# Patient Record
Sex: MALE | Race: WHITE | NOT HISPANIC OR LATINO | Employment: FULL TIME | URBAN - METROPOLITAN AREA
[De-identification: names, ages, dates, MRNs, and addresses within clinical notes are randomized per-mention and may not be internally consistent; named-entity substitution may affect disease eponyms.]

---

## 2017-01-18 ENCOUNTER — COMMUNICATION - HEALTHEAST (OUTPATIENT)
Dept: FAMILY MEDICINE | Facility: CLINIC | Age: 47
End: 2017-01-18

## 2017-01-18 DIAGNOSIS — F90.0 ATTENTION DEFICIT HYPERACTIVITY DISORDER (ADHD), PREDOMINANTLY INATTENTIVE TYPE: ICD-10-CM

## 2017-02-16 ENCOUNTER — COMMUNICATION - HEALTHEAST (OUTPATIENT)
Dept: FAMILY MEDICINE | Facility: CLINIC | Age: 47
End: 2017-02-16

## 2017-02-16 DIAGNOSIS — F90.0 ATTENTION DEFICIT HYPERACTIVITY DISORDER (ADHD), PREDOMINANTLY INATTENTIVE TYPE: ICD-10-CM

## 2017-03-22 ENCOUNTER — COMMUNICATION - HEALTHEAST (OUTPATIENT)
Dept: FAMILY MEDICINE | Facility: CLINIC | Age: 47
End: 2017-03-22

## 2017-03-22 DIAGNOSIS — F90.0 ATTENTION DEFICIT HYPERACTIVITY DISORDER (ADHD), PREDOMINANTLY INATTENTIVE TYPE: ICD-10-CM

## 2017-04-25 ENCOUNTER — COMMUNICATION - HEALTHEAST (OUTPATIENT)
Dept: SCHEDULING | Facility: CLINIC | Age: 47
End: 2017-04-25

## 2017-04-25 DIAGNOSIS — F90.0 ATTENTION DEFICIT HYPERACTIVITY DISORDER (ADHD), PREDOMINANTLY INATTENTIVE TYPE: ICD-10-CM

## 2017-06-01 ENCOUNTER — COMMUNICATION - HEALTHEAST (OUTPATIENT)
Dept: FAMILY MEDICINE | Facility: CLINIC | Age: 47
End: 2017-06-01

## 2017-06-01 DIAGNOSIS — F90.0 ATTENTION DEFICIT HYPERACTIVITY DISORDER (ADHD), PREDOMINANTLY INATTENTIVE TYPE: ICD-10-CM

## 2017-06-28 ENCOUNTER — COMMUNICATION - HEALTHEAST (OUTPATIENT)
Dept: FAMILY MEDICINE | Facility: CLINIC | Age: 47
End: 2017-06-28

## 2017-06-28 DIAGNOSIS — F90.0 ATTENTION DEFICIT HYPERACTIVITY DISORDER (ADHD), PREDOMINANTLY INATTENTIVE TYPE: ICD-10-CM

## 2017-08-03 ENCOUNTER — COMMUNICATION - HEALTHEAST (OUTPATIENT)
Dept: FAMILY MEDICINE | Facility: CLINIC | Age: 47
End: 2017-08-03

## 2017-08-09 ENCOUNTER — COMMUNICATION - HEALTHEAST (OUTPATIENT)
Dept: FAMILY MEDICINE | Facility: CLINIC | Age: 47
End: 2017-08-09

## 2017-08-14 ENCOUNTER — OFFICE VISIT - HEALTHEAST (OUTPATIENT)
Dept: FAMILY MEDICINE | Facility: CLINIC | Age: 47
End: 2017-08-14

## 2017-08-14 DIAGNOSIS — F90.0 ATTENTION DEFICIT HYPERACTIVITY DISORDER (ADHD), PREDOMINANTLY INATTENTIVE TYPE: ICD-10-CM

## 2017-08-14 ASSESSMENT — MIFFLIN-ST. JEOR: SCORE: 1989.37

## 2017-09-15 ENCOUNTER — COMMUNICATION - HEALTHEAST (OUTPATIENT)
Dept: FAMILY MEDICINE | Facility: CLINIC | Age: 47
End: 2017-09-15

## 2017-09-15 DIAGNOSIS — F90.0 ATTENTION DEFICIT HYPERACTIVITY DISORDER (ADHD), PREDOMINANTLY INATTENTIVE TYPE: ICD-10-CM

## 2017-09-29 ENCOUNTER — OFFICE VISIT - HEALTHEAST (OUTPATIENT)
Dept: FAMILY MEDICINE | Facility: CLINIC | Age: 47
End: 2017-09-29

## 2017-09-29 DIAGNOSIS — F90.9 ADHD (ATTENTION DEFICIT HYPERACTIVITY DISORDER): ICD-10-CM

## 2017-09-29 DIAGNOSIS — Z00.00 PREVENTATIVE HEALTH CARE: ICD-10-CM

## 2017-09-29 LAB
CHOLEST SERPL-MCNC: 241 MG/DL
FASTING STATUS PATIENT QL REPORTED: YES
HDLC SERPL-MCNC: 28 MG/DL
LDLC SERPL CALC-MCNC: 155 MG/DL
TRIGL SERPL-MCNC: 289 MG/DL

## 2017-09-29 ASSESSMENT — MIFFLIN-ST. JEOR: SCORE: 1974.9

## 2017-10-11 ENCOUNTER — COMMUNICATION - HEALTHEAST (OUTPATIENT)
Dept: HEALTH INFORMATION MANAGEMENT | Facility: CLINIC | Age: 47
End: 2017-10-11

## 2017-10-11 ENCOUNTER — COMMUNICATION - HEALTHEAST (OUTPATIENT)
Dept: TELEHEALTH | Facility: CLINIC | Age: 47
End: 2017-10-11

## 2017-10-19 ENCOUNTER — COMMUNICATION - HEALTHEAST (OUTPATIENT)
Dept: FAMILY MEDICINE | Facility: CLINIC | Age: 47
End: 2017-10-19

## 2017-10-19 DIAGNOSIS — F90.0 ATTENTION DEFICIT HYPERACTIVITY DISORDER (ADHD), PREDOMINANTLY INATTENTIVE TYPE: ICD-10-CM

## 2017-11-27 ENCOUNTER — COMMUNICATION - HEALTHEAST (OUTPATIENT)
Dept: FAMILY MEDICINE | Facility: CLINIC | Age: 47
End: 2017-11-27

## 2017-11-27 DIAGNOSIS — F90.0 ATTENTION DEFICIT HYPERACTIVITY DISORDER (ADHD), PREDOMINANTLY INATTENTIVE TYPE: ICD-10-CM

## 2017-11-30 ENCOUNTER — OFFICE VISIT - HEALTHEAST (OUTPATIENT)
Dept: FAMILY MEDICINE | Facility: CLINIC | Age: 47
End: 2017-11-30

## 2017-11-30 DIAGNOSIS — Z00.00 ROUTINE GENERAL MEDICAL EXAMINATION AT A HEALTH CARE FACILITY: ICD-10-CM

## 2017-11-30 DIAGNOSIS — F90.9 ADHD (ATTENTION DEFICIT HYPERACTIVITY DISORDER): ICD-10-CM

## 2017-11-30 DIAGNOSIS — R03.0 ELEVATED BLOOD PRESSURE READING: ICD-10-CM

## 2017-11-30 DIAGNOSIS — F33.9 MAJOR DEPRESSIVE DISORDER, RECURRENT EPISODE (H): ICD-10-CM

## 2017-11-30 LAB
CHOLEST SERPL-MCNC: 247 MG/DL
FASTING STATUS PATIENT QL REPORTED: NO
HDLC SERPL-MCNC: 33 MG/DL
LDLC SERPL CALC-MCNC: 148 MG/DL
TRIGL SERPL-MCNC: 328 MG/DL

## 2017-11-30 ASSESSMENT — MIFFLIN-ST. JEOR: SCORE: 1949.96

## 2018-01-02 ENCOUNTER — COMMUNICATION - HEALTHEAST (OUTPATIENT)
Dept: FAMILY MEDICINE | Facility: CLINIC | Age: 48
End: 2018-01-02

## 2018-01-02 DIAGNOSIS — F90.0 ATTENTION DEFICIT HYPERACTIVITY DISORDER (ADHD), PREDOMINANTLY INATTENTIVE TYPE: ICD-10-CM

## 2018-01-04 ENCOUNTER — COMMUNICATION - HEALTHEAST (OUTPATIENT)
Dept: FAMILY MEDICINE | Facility: CLINIC | Age: 48
End: 2018-01-04

## 2018-01-05 ENCOUNTER — RECORDS - HEALTHEAST (OUTPATIENT)
Dept: ADMINISTRATIVE | Facility: OTHER | Age: 48
End: 2018-01-05

## 2018-01-08 ENCOUNTER — AMBULATORY - HEALTHEAST (OUTPATIENT)
Dept: FAMILY MEDICINE | Facility: CLINIC | Age: 48
End: 2018-01-08

## 2018-01-12 ENCOUNTER — COMMUNICATION - HEALTHEAST (OUTPATIENT)
Dept: FAMILY MEDICINE | Facility: CLINIC | Age: 48
End: 2018-01-12

## 2018-01-18 ENCOUNTER — OFFICE VISIT - HEALTHEAST (OUTPATIENT)
Dept: FAMILY MEDICINE | Facility: CLINIC | Age: 48
End: 2018-01-18

## 2018-01-18 DIAGNOSIS — Z00.00 PREVENTATIVE HEALTH CARE: ICD-10-CM

## 2018-01-18 DIAGNOSIS — F33.9 MAJOR DEPRESSIVE DISORDER, RECURRENT EPISODE (H): ICD-10-CM

## 2018-01-18 DIAGNOSIS — F90.9 ADHD (ATTENTION DEFICIT HYPERACTIVITY DISORDER): ICD-10-CM

## 2018-01-18 DIAGNOSIS — I10 HYPERTENSION: ICD-10-CM

## 2018-01-30 ENCOUNTER — COMMUNICATION - HEALTHEAST (OUTPATIENT)
Dept: FAMILY MEDICINE | Facility: CLINIC | Age: 48
End: 2018-01-30

## 2018-01-31 ENCOUNTER — RECORDS - HEALTHEAST (OUTPATIENT)
Dept: ADMINISTRATIVE | Facility: OTHER | Age: 48
End: 2018-01-31

## 2018-02-09 ENCOUNTER — RECORDS - HEALTHEAST (OUTPATIENT)
Dept: ADMINISTRATIVE | Facility: OTHER | Age: 48
End: 2018-02-09

## 2018-02-10 ENCOUNTER — RECORDS - HEALTHEAST (OUTPATIENT)
Dept: ADMINISTRATIVE | Facility: OTHER | Age: 48
End: 2018-02-10

## 2018-02-13 ENCOUNTER — AMBULATORY - HEALTHEAST (OUTPATIENT)
Dept: NURSING | Facility: CLINIC | Age: 48
End: 2018-02-13

## 2018-02-13 DIAGNOSIS — I10 ESSENTIAL HYPERTENSION: ICD-10-CM

## 2018-02-20 ENCOUNTER — COMMUNICATION - HEALTHEAST (OUTPATIENT)
Dept: FAMILY MEDICINE | Facility: CLINIC | Age: 48
End: 2018-02-20

## 2018-03-12 ENCOUNTER — COMMUNICATION - HEALTHEAST (OUTPATIENT)
Dept: FAMILY MEDICINE | Facility: CLINIC | Age: 48
End: 2018-03-12

## 2018-03-15 ENCOUNTER — COMMUNICATION - HEALTHEAST (OUTPATIENT)
Dept: FAMILY MEDICINE | Facility: CLINIC | Age: 48
End: 2018-03-15

## 2018-03-20 ENCOUNTER — COMMUNICATION - HEALTHEAST (OUTPATIENT)
Dept: FAMILY MEDICINE | Facility: CLINIC | Age: 48
End: 2018-03-20

## 2018-03-28 ENCOUNTER — AMBULATORY - HEALTHEAST (OUTPATIENT)
Dept: FAMILY MEDICINE | Facility: CLINIC | Age: 48
End: 2018-03-28

## 2018-03-28 DIAGNOSIS — F90.0 ATTENTION DEFICIT HYPERACTIVITY DISORDER (ADHD), PREDOMINANTLY INATTENTIVE TYPE: ICD-10-CM

## 2018-03-30 ENCOUNTER — COMMUNICATION - HEALTHEAST (OUTPATIENT)
Dept: FAMILY MEDICINE | Facility: CLINIC | Age: 48
End: 2018-03-30

## 2018-04-06 ENCOUNTER — OFFICE VISIT - HEALTHEAST (OUTPATIENT)
Dept: FAMILY MEDICINE | Facility: CLINIC | Age: 48
End: 2018-04-06

## 2018-04-06 DIAGNOSIS — E78.5 HYPERLIPIDEMIA: ICD-10-CM

## 2018-04-06 DIAGNOSIS — F90.0 ATTENTION DEFICIT HYPERACTIVITY DISORDER (ADHD), PREDOMINANTLY INATTENTIVE TYPE: ICD-10-CM

## 2018-04-06 DIAGNOSIS — F41.1 ANXIETY STATE: ICD-10-CM

## 2018-04-06 DIAGNOSIS — I10 ESSENTIAL HYPERTENSION: ICD-10-CM

## 2018-04-06 DIAGNOSIS — F90.9 ADHD (ATTENTION DEFICIT HYPERACTIVITY DISORDER): ICD-10-CM

## 2018-04-06 ASSESSMENT — MIFFLIN-ST. JEOR: SCORE: 1962.15

## 2018-05-24 ENCOUNTER — COMMUNICATION - HEALTHEAST (OUTPATIENT)
Dept: FAMILY MEDICINE | Facility: CLINIC | Age: 48
End: 2018-05-24

## 2018-05-24 DIAGNOSIS — F90.0 ATTENTION DEFICIT HYPERACTIVITY DISORDER (ADHD), PREDOMINANTLY INATTENTIVE TYPE: ICD-10-CM

## 2018-06-18 ENCOUNTER — COMMUNICATION - HEALTHEAST (OUTPATIENT)
Dept: FAMILY MEDICINE | Facility: CLINIC | Age: 48
End: 2018-06-18

## 2018-06-18 DIAGNOSIS — F90.0 ATTENTION DEFICIT HYPERACTIVITY DISORDER (ADHD), PREDOMINANTLY INATTENTIVE TYPE: ICD-10-CM

## 2018-08-13 ENCOUNTER — COMMUNICATION - HEALTHEAST (OUTPATIENT)
Dept: FAMILY MEDICINE | Facility: CLINIC | Age: 48
End: 2018-08-13

## 2018-08-13 DIAGNOSIS — F90.0 ATTENTION DEFICIT HYPERACTIVITY DISORDER (ADHD), PREDOMINANTLY INATTENTIVE TYPE: ICD-10-CM

## 2018-09-01 ENCOUNTER — COMMUNICATION - HEALTHEAST (OUTPATIENT)
Dept: FAMILY MEDICINE | Facility: CLINIC | Age: 48
End: 2018-09-01

## 2018-09-01 DIAGNOSIS — F33.42 MAJOR DEPRESSIVE DISORDER, RECURRENT EPISODE, IN FULL REMISSION (H): ICD-10-CM

## 2018-09-24 ENCOUNTER — COMMUNICATION - HEALTHEAST (OUTPATIENT)
Dept: FAMILY MEDICINE | Facility: CLINIC | Age: 48
End: 2018-09-24

## 2018-09-24 DIAGNOSIS — F90.0 ATTENTION DEFICIT HYPERACTIVITY DISORDER (ADHD), PREDOMINANTLY INATTENTIVE TYPE: ICD-10-CM

## 2018-10-29 ENCOUNTER — COMMUNICATION - HEALTHEAST (OUTPATIENT)
Dept: FAMILY MEDICINE | Facility: CLINIC | Age: 48
End: 2018-10-29

## 2018-10-29 DIAGNOSIS — F90.0 ATTENTION DEFICIT HYPERACTIVITY DISORDER (ADHD), PREDOMINANTLY INATTENTIVE TYPE: ICD-10-CM

## 2018-10-29 DIAGNOSIS — I10 HTN (HYPERTENSION): ICD-10-CM

## 2018-12-03 ENCOUNTER — COMMUNICATION - HEALTHEAST (OUTPATIENT)
Dept: FAMILY MEDICINE | Facility: CLINIC | Age: 48
End: 2018-12-03

## 2018-12-03 DIAGNOSIS — F90.0 ATTENTION DEFICIT HYPERACTIVITY DISORDER (ADHD), PREDOMINANTLY INATTENTIVE TYPE: ICD-10-CM

## 2018-12-07 ENCOUNTER — OFFICE VISIT - HEALTHEAST (OUTPATIENT)
Dept: FAMILY MEDICINE | Facility: CLINIC | Age: 48
End: 2018-12-07

## 2018-12-07 DIAGNOSIS — F90.0 ATTENTION DEFICIT HYPERACTIVITY DISORDER (ADHD), PREDOMINANTLY INATTENTIVE TYPE: ICD-10-CM

## 2018-12-07 DIAGNOSIS — I10 ESSENTIAL HYPERTENSION: ICD-10-CM

## 2018-12-07 DIAGNOSIS — Z23 NEED FOR IMMUNIZATION AGAINST INFLUENZA: ICD-10-CM

## 2018-12-07 DIAGNOSIS — Z00.00 ROUTINE GENERAL MEDICAL EXAMINATION AT A HEALTH CARE FACILITY: ICD-10-CM

## 2018-12-07 DIAGNOSIS — F33.1 MODERATE EPISODE OF RECURRENT MAJOR DEPRESSIVE DISORDER (H): ICD-10-CM

## 2018-12-07 LAB
ANION GAP SERPL CALCULATED.3IONS-SCNC: 11 MMOL/L (ref 5–18)
BUN SERPL-MCNC: 14 MG/DL (ref 8–22)
CALCIUM SERPL-MCNC: 10 MG/DL (ref 8.5–10.5)
CHLORIDE BLD-SCNC: 105 MMOL/L (ref 98–107)
CHOLEST SERPL-MCNC: 241 MG/DL
CO2 SERPL-SCNC: 26 MMOL/L (ref 22–31)
CREAT SERPL-MCNC: 0.94 MG/DL (ref 0.7–1.3)
FASTING STATUS PATIENT QL REPORTED: YES
GFR SERPL CREATININE-BSD FRML MDRD: >60 ML/MIN/1.73M2
GLUCOSE BLD-MCNC: 109 MG/DL (ref 70–125)
HDLC SERPL-MCNC: 36 MG/DL
LDLC SERPL CALC-MCNC: 163 MG/DL
POTASSIUM BLD-SCNC: 4.1 MMOL/L (ref 3.5–5)
SODIUM SERPL-SCNC: 142 MMOL/L (ref 136–145)
TRIGL SERPL-MCNC: 210 MG/DL
TSH SERPL DL<=0.005 MIU/L-ACNC: 2.25 UIU/ML (ref 0.3–5)

## 2018-12-07 ASSESSMENT — MIFFLIN-ST. JEOR: SCORE: 2030.12

## 2019-01-15 ENCOUNTER — OFFICE VISIT - HEALTHEAST (OUTPATIENT)
Dept: FAMILY MEDICINE | Facility: CLINIC | Age: 49
End: 2019-01-15

## 2019-01-15 DIAGNOSIS — I10 ESSENTIAL HYPERTENSION: ICD-10-CM

## 2019-01-15 DIAGNOSIS — F90.0 ATTENTION DEFICIT HYPERACTIVITY DISORDER (ADHD), PREDOMINANTLY INATTENTIVE TYPE: ICD-10-CM

## 2019-01-15 DIAGNOSIS — F33.1 MODERATE EPISODE OF RECURRENT MAJOR DEPRESSIVE DISORDER (H): ICD-10-CM

## 2019-01-15 ASSESSMENT — MIFFLIN-ST. JEOR: SCORE: 1957.04

## 2019-02-07 ENCOUNTER — COMMUNICATION - HEALTHEAST (OUTPATIENT)
Dept: FAMILY MEDICINE | Facility: CLINIC | Age: 49
End: 2019-02-07

## 2019-02-07 DIAGNOSIS — F33.1 MODERATE EPISODE OF RECURRENT MAJOR DEPRESSIVE DISORDER (H): ICD-10-CM

## 2019-04-08 ENCOUNTER — COMMUNICATION - HEALTHEAST (OUTPATIENT)
Dept: FAMILY MEDICINE | Facility: CLINIC | Age: 49
End: 2019-04-08

## 2019-04-08 DIAGNOSIS — F90.0 ATTENTION DEFICIT HYPERACTIVITY DISORDER (ADHD), PREDOMINANTLY INATTENTIVE TYPE: ICD-10-CM

## 2019-04-09 ENCOUNTER — AMBULATORY - HEALTHEAST (OUTPATIENT)
Dept: FAMILY MEDICINE | Facility: CLINIC | Age: 49
End: 2019-04-09

## 2019-04-09 DIAGNOSIS — F90.0 ATTENTION DEFICIT HYPERACTIVITY DISORDER (ADHD), PREDOMINANTLY INATTENTIVE TYPE: ICD-10-CM

## 2019-04-10 ENCOUNTER — COMMUNICATION - HEALTHEAST (OUTPATIENT)
Dept: FAMILY MEDICINE | Facility: CLINIC | Age: 49
End: 2019-04-10

## 2019-04-11 ENCOUNTER — COMMUNICATION - HEALTHEAST (OUTPATIENT)
Dept: FAMILY MEDICINE | Facility: CLINIC | Age: 49
End: 2019-04-11

## 2019-05-28 ENCOUNTER — COMMUNICATION - HEALTHEAST (OUTPATIENT)
Dept: FAMILY MEDICINE | Facility: CLINIC | Age: 49
End: 2019-05-28

## 2019-05-28 DIAGNOSIS — F90.0 ATTENTION DEFICIT HYPERACTIVITY DISORDER (ADHD), PREDOMINANTLY INATTENTIVE TYPE: ICD-10-CM

## 2019-06-13 ENCOUNTER — AMBULATORY - HEALTHEAST (OUTPATIENT)
Dept: FAMILY MEDICINE | Facility: CLINIC | Age: 49
End: 2019-06-13

## 2019-06-13 ENCOUNTER — COMMUNICATION - HEALTHEAST (OUTPATIENT)
Dept: FAMILY MEDICINE | Facility: CLINIC | Age: 49
End: 2019-06-13

## 2019-07-09 ENCOUNTER — OFFICE VISIT - HEALTHEAST (OUTPATIENT)
Dept: FAMILY MEDICINE | Facility: CLINIC | Age: 49
End: 2019-07-09

## 2019-07-09 DIAGNOSIS — F90.0 ATTENTION DEFICIT HYPERACTIVITY DISORDER (ADHD), PREDOMINANTLY INATTENTIVE TYPE: ICD-10-CM

## 2019-07-09 DIAGNOSIS — I10 ESSENTIAL HYPERTENSION: ICD-10-CM

## 2019-07-09 DIAGNOSIS — F33.1 MODERATE EPISODE OF RECURRENT MAJOR DEPRESSIVE DISORDER (H): ICD-10-CM

## 2019-07-09 ASSESSMENT — MIFFLIN-ST. JEOR: SCORE: 1981.71

## 2019-07-13 ENCOUNTER — COMMUNICATION - HEALTHEAST (OUTPATIENT)
Dept: FAMILY MEDICINE | Facility: CLINIC | Age: 49
End: 2019-07-13

## 2019-07-13 DIAGNOSIS — I10 HTN (HYPERTENSION): ICD-10-CM

## 2019-08-15 ENCOUNTER — COMMUNICATION - HEALTHEAST (OUTPATIENT)
Dept: FAMILY MEDICINE | Facility: CLINIC | Age: 49
End: 2019-08-15

## 2019-08-15 DIAGNOSIS — F90.0 ATTENTION DEFICIT HYPERACTIVITY DISORDER (ADHD), PREDOMINANTLY INATTENTIVE TYPE: ICD-10-CM

## 2019-09-13 ENCOUNTER — COMMUNICATION - HEALTHEAST (OUTPATIENT)
Dept: FAMILY MEDICINE | Facility: CLINIC | Age: 49
End: 2019-09-13

## 2019-09-13 DIAGNOSIS — F90.0 ATTENTION DEFICIT HYPERACTIVITY DISORDER (ADHD), PREDOMINANTLY INATTENTIVE TYPE: ICD-10-CM

## 2019-10-16 ENCOUNTER — COMMUNICATION - HEALTHEAST (OUTPATIENT)
Dept: FAMILY MEDICINE | Facility: CLINIC | Age: 49
End: 2019-10-16

## 2019-10-16 DIAGNOSIS — F90.0 ATTENTION DEFICIT HYPERACTIVITY DISORDER (ADHD), PREDOMINANTLY INATTENTIVE TYPE: ICD-10-CM

## 2019-11-19 ENCOUNTER — COMMUNICATION - HEALTHEAST (OUTPATIENT)
Dept: FAMILY MEDICINE | Facility: CLINIC | Age: 49
End: 2019-11-19

## 2019-11-19 DIAGNOSIS — F90.0 ATTENTION DEFICIT HYPERACTIVITY DISORDER (ADHD), PREDOMINANTLY INATTENTIVE TYPE: ICD-10-CM

## 2019-11-27 ENCOUNTER — AMBULATORY - HEALTHEAST (OUTPATIENT)
Dept: NURSING | Facility: CLINIC | Age: 49
End: 2019-11-27

## 2019-12-13 ENCOUNTER — OFFICE VISIT - HEALTHEAST (OUTPATIENT)
Dept: FAMILY MEDICINE | Facility: CLINIC | Age: 49
End: 2019-12-13

## 2019-12-13 DIAGNOSIS — I10 ESSENTIAL HYPERTENSION: ICD-10-CM

## 2019-12-13 DIAGNOSIS — F90.0 ATTENTION DEFICIT HYPERACTIVITY DISORDER (ADHD), PREDOMINANTLY INATTENTIVE TYPE: ICD-10-CM

## 2019-12-13 DIAGNOSIS — Z00.00 ROUTINE GENERAL MEDICAL EXAMINATION AT A HEALTH CARE FACILITY: ICD-10-CM

## 2019-12-13 DIAGNOSIS — F33.1 MODERATE EPISODE OF RECURRENT MAJOR DEPRESSIVE DISORDER (H): ICD-10-CM

## 2019-12-13 DIAGNOSIS — J30.1 SEASONAL ALLERGIC RHINITIS DUE TO POLLEN: ICD-10-CM

## 2019-12-13 ASSESSMENT — MIFFLIN-ST. JEOR: SCORE: 2036.09

## 2019-12-13 ASSESSMENT — PATIENT HEALTH QUESTIONNAIRE - PHQ9: SUM OF ALL RESPONSES TO PHQ QUESTIONS 1-9: 5

## 2019-12-20 ENCOUNTER — AMBULATORY - HEALTHEAST (OUTPATIENT)
Dept: LAB | Facility: CLINIC | Age: 49
End: 2019-12-20

## 2019-12-20 DIAGNOSIS — Z00.00 ROUTINE GENERAL MEDICAL EXAMINATION AT A HEALTH CARE FACILITY: ICD-10-CM

## 2019-12-20 LAB
ANION GAP SERPL CALCULATED.3IONS-SCNC: 10 MMOL/L (ref 5–18)
BUN SERPL-MCNC: 13 MG/DL (ref 8–22)
CALCIUM SERPL-MCNC: 10.3 MG/DL (ref 8.5–10.5)
CHLORIDE BLD-SCNC: 101 MMOL/L (ref 98–107)
CHOLEST SERPL-MCNC: 281 MG/DL
CO2 SERPL-SCNC: 28 MMOL/L (ref 22–31)
CREAT SERPL-MCNC: 1.2 MG/DL (ref 0.7–1.3)
FASTING STATUS PATIENT QL REPORTED: YES
GFR SERPL CREATININE-BSD FRML MDRD: >60 ML/MIN/1.73M2
GLUCOSE BLD-MCNC: 90 MG/DL (ref 70–125)
HDLC SERPL-MCNC: 31 MG/DL
LDLC SERPL CALC-MCNC: 199 MG/DL
POTASSIUM BLD-SCNC: 4.2 MMOL/L (ref 3.5–5)
SODIUM SERPL-SCNC: 139 MMOL/L (ref 136–145)
TRIGL SERPL-MCNC: 257 MG/DL

## 2019-12-23 ENCOUNTER — COMMUNICATION - HEALTHEAST (OUTPATIENT)
Dept: FAMILY MEDICINE | Facility: CLINIC | Age: 49
End: 2019-12-23

## 2019-12-23 DIAGNOSIS — F90.0 ATTENTION DEFICIT HYPERACTIVITY DISORDER (ADHD), PREDOMINANTLY INATTENTIVE TYPE: ICD-10-CM

## 2020-01-05 ENCOUNTER — COMMUNICATION - HEALTHEAST (OUTPATIENT)
Dept: FAMILY MEDICINE | Facility: CLINIC | Age: 50
End: 2020-01-05

## 2020-01-05 DIAGNOSIS — J30.1 SEASONAL ALLERGIC RHINITIS DUE TO POLLEN: ICD-10-CM

## 2020-02-04 ENCOUNTER — COMMUNICATION - HEALTHEAST (OUTPATIENT)
Dept: FAMILY MEDICINE | Facility: CLINIC | Age: 50
End: 2020-02-04

## 2020-02-04 DIAGNOSIS — F90.0 ATTENTION DEFICIT HYPERACTIVITY DISORDER (ADHD), PREDOMINANTLY INATTENTIVE TYPE: ICD-10-CM

## 2020-02-15 ENCOUNTER — COMMUNICATION - HEALTHEAST (OUTPATIENT)
Dept: FAMILY MEDICINE | Facility: CLINIC | Age: 50
End: 2020-02-15

## 2020-02-15 DIAGNOSIS — F33.1 MODERATE EPISODE OF RECURRENT MAJOR DEPRESSIVE DISORDER (H): ICD-10-CM

## 2020-02-19 RX ORDER — DULOXETIN HYDROCHLORIDE 30 MG/1
CAPSULE, DELAYED RELEASE ORAL
Qty: 90 CAPSULE | Refills: 2 | Status: SHIPPED | OUTPATIENT
Start: 2020-02-19 | End: 2022-09-20 | Stop reason: ALTCHOICE

## 2020-03-05 ENCOUNTER — COMMUNICATION - HEALTHEAST (OUTPATIENT)
Dept: FAMILY MEDICINE | Facility: CLINIC | Age: 50
End: 2020-03-05

## 2020-03-05 ENCOUNTER — AMBULATORY - HEALTHEAST (OUTPATIENT)
Dept: FAMILY MEDICINE | Facility: CLINIC | Age: 50
End: 2020-03-05

## 2020-03-05 DIAGNOSIS — F90.0 ATTENTION DEFICIT HYPERACTIVITY DISORDER (ADHD), PREDOMINANTLY INATTENTIVE TYPE: ICD-10-CM

## 2020-03-31 ENCOUNTER — COMMUNICATION - HEALTHEAST (OUTPATIENT)
Dept: FAMILY MEDICINE | Facility: CLINIC | Age: 50
End: 2020-03-31

## 2020-03-31 ENCOUNTER — AMBULATORY - HEALTHEAST (OUTPATIENT)
Dept: FAMILY MEDICINE | Facility: CLINIC | Age: 50
End: 2020-03-31

## 2020-03-31 DIAGNOSIS — F90.0 ATTENTION DEFICIT HYPERACTIVITY DISORDER (ADHD), PREDOMINANTLY INATTENTIVE TYPE: ICD-10-CM

## 2020-04-26 ENCOUNTER — VIRTUAL VISIT (OUTPATIENT)
Dept: FAMILY MEDICINE | Facility: OTHER | Age: 50
End: 2020-04-26

## 2020-04-26 ENCOUNTER — NURSE TRIAGE (OUTPATIENT)
Dept: NURSING | Facility: CLINIC | Age: 50
End: 2020-04-26

## 2020-04-26 NOTE — TELEPHONE ENCOUNTER
Was at grocery store yesterday - yesterday came home and rubbed eyes before washing hands. He then flushed his eyes out. Now has pink eye.     Per protocol - advised [email or web address] evaluation - patient has also questions about being tested for covid. Advised patient that more universal testing is coming, but no information available on that at this time.    Rosaura Dubose RN on 4/26/2020 at 12:48 PM    Reason for Disposition    [1] Eye with yellow/green discharge or eyelashes stick together AND [2] NO PCP standing order to call in antibiotic eye drops    Additional Information    Negative: Eye exposure to chemical or fumes    Negative: Redness of white of eye (sclera), but no pus or only a small amount of brief pus    Negative: SEVERE eye pain (e.g., excruciating)    Negative: [1] Eyelids are very swollen (shut or almost) AND [2] fever    Negative: [1] Eyelid (outer) is very red (or tender to touch) AND [2] fever    Negative: Patient sounds very sick or weak to the triager    Negative: MODERATE eye pain (e.g., interferes with normal activities)    Negative: Fever > 104 F (40 C)    Negative: Cloudy spot or sore seen on the cornea (clear part of the eye)    Negative: Blurred vision    Negative: Eye is very swollen (shut or almost)    Negative: [1] MILD eye pain or discomfort AND [2] wears contacts    Negative: Eyelid is red and painful (or tender to touch)    Negative: Discharge from penis    Negative: New or abnormal vaginal discharge    Negative: Fever present > 3 days (72 hours)    Negative: [1] Lots of yellow or green nasal discharge AND [2] present now AND [3] fever    Negative: Weak immune system (e.g., HIV positive, cancer chemo, splenectomy, organ transplant, chronic steroids)    Protocols used: EYE - PUS OR [id number]

## 2020-04-26 NOTE — PROGRESS NOTES
"Date: 2020 13:28:01  Clinician: Jyotsna Villasenor  Clinician NPI: [id number]  Patient: Teddy Ayala  Patient : 1970  Patient Address: [address], [place] [address]  Patient Phone: [phone]  Visit Protocol: Eye conditions  Patient Summary:  Teddy is a 50 year old (: 1970 ) male who initiated a Visit for conjunctivitis.  When asked the question \"Please sign me up to receive news, health information and promotions from [employer].\", Teddy responded \"No\".    Images of his eye condition were uploaded.   His symptoms started 1-2 days ago and affect the left eye. The symptoms consist of itchy eye(s), eye pain, drainage coming from the eye(s), and eye redness.   Symptom details     Drainage: The color of the drainage coming out of his eye(s) is yellow. The drainage is thick and causes his eyelids to be stuck shut in the morning.    Itchiness: Teddy does not have seasonal allergies or hay fever.    Eye pain: He is experiencing mild eye pain (1-3 on a 10 point pain scale). He has not taken over-the-counter medication for the pain.     Denied symptoms include eyelid swelling, light sensitivity, and bumps on the eyelid. Teddy does not have subconjunctival hemorrhage and has not experienced a decrease in vision. He does not feel feverish.   Precipitating events   Teddy has not been exposed to someone with a red eye or an eye infection and has not had a recent diagnosis of conjunctivitis. He also has not had a recent cold or ear infection, eye surgery, foreign body in the eye(s), and eye injury. He does not wear contact lenses.   Pertinent medical history  Teddy has not ever been diagnosed with glaucoma.   Teddy is not taking medication to treat his current symptoms.   Teddy does not require proof of evaluation of his eye condition before returning to school, work, or .   Teddy does not smoke or use smokeless tobacco.     MEDICATIONS: hydrochlorothiazide oral, cetirizine oral, fluticasone " propionate topical, Adderall XR oral, ALLERGIES: NKDA  Clinician Response:  Dear Teddy,  Based on the information provided, you most likely have bacterial conjunctivitis, more commonly called pink eye.  Medication information  I am prescribing:  Polymyxin B sulf-trimethoprim (Polytrim) 10,000 unit- 1 mg/mL ophthalmic (eye) drops. Apply 1 drop into the affected eye(s) every 3 hours, up to 6 times a day for 7 days. There are no refills with this prescription.  The medication I prescribed is an antibiotic medication. Infections can be caused by either bacteria or a virus, and often have similar symptoms, so it is possible that this is a viral infection. Antibiotics are only effective against bacterial infections, so when it is caused by a virus, the medication will not help symptoms improve or make it less contagious.  Self care  To reduce the spread of the eye infection, be sure to wash your hands at least once per hour and avoid touching the eyes as much as possible.  The following will reduce the risk for future eye infections:     Frequent handwashing    Replace towels and washcloths daily    Do not share towels and washcloths with others     Steps you can take to be as comfortable as possible:     Avoid rubbing your eyes    Apply a cool compress to the eye(s)    Take regular breaks and remember to blink regularly when reading or using a computer for long periods of time    Wear sunglasses when outside    Wear eye protection when swimming or working with chemicals    Use good lighting     When to seek care  Please make an appointment to be seen in a clinic or urgent care if any of the following occurs:     You develop new symptoms or your symptoms becomes worse.    Your symptoms do not improve within 2 days of starting treatment.      Diagnosis: Bacterial conjunctivitis  Diagnosis ICD: H10.9  Prescription: polymyxin B sulf-trimethoprim (Polytrim) 10,000 unit- 1 mg/mL ophthalmic (eye) drops 1 10 ml dropper bottle, 7  days supply. Apply 1 drop into the affected eye(s) every 3 hours up to 6 times a day for 7 days. Refills: 0, Refill as needed: no, Allow substitutions: yes  Pharmacy: [id number] - [phone] - [address] [place] [address]

## 2020-05-14 ENCOUNTER — COMMUNICATION - HEALTHEAST (OUTPATIENT)
Dept: FAMILY MEDICINE | Facility: CLINIC | Age: 50
End: 2020-05-14

## 2020-05-14 DIAGNOSIS — F90.0 ATTENTION DEFICIT HYPERACTIVITY DISORDER (ADHD), PREDOMINANTLY INATTENTIVE TYPE: ICD-10-CM

## 2020-06-11 ENCOUNTER — COMMUNICATION - HEALTHEAST (OUTPATIENT)
Dept: FAMILY MEDICINE | Facility: CLINIC | Age: 50
End: 2020-06-11

## 2020-06-11 DIAGNOSIS — F90.0 ATTENTION DEFICIT HYPERACTIVITY DISORDER (ADHD), PREDOMINANTLY INATTENTIVE TYPE: ICD-10-CM

## 2020-06-19 ENCOUNTER — OFFICE VISIT - HEALTHEAST (OUTPATIENT)
Dept: FAMILY MEDICINE | Facility: CLINIC | Age: 50
End: 2020-06-19

## 2020-06-19 DIAGNOSIS — E78.5 HYPERLIPIDEMIA LDL GOAL <130: ICD-10-CM

## 2020-06-19 DIAGNOSIS — F90.0 ATTENTION DEFICIT HYPERACTIVITY DISORDER (ADHD), PREDOMINANTLY INATTENTIVE TYPE: ICD-10-CM

## 2020-07-03 ENCOUNTER — COMMUNICATION - HEALTHEAST (OUTPATIENT)
Dept: FAMILY MEDICINE | Facility: CLINIC | Age: 50
End: 2020-07-03

## 2020-07-03 DIAGNOSIS — I10 HTN (HYPERTENSION): ICD-10-CM

## 2020-07-03 RX ORDER — HYDROCHLOROTHIAZIDE 25 MG/1
TABLET ORAL
Qty: 90 TABLET | Refills: 3 | Status: SHIPPED | OUTPATIENT
Start: 2020-07-03 | End: 2021-07-20

## 2020-07-21 ENCOUNTER — COMMUNICATION - HEALTHEAST (OUTPATIENT)
Dept: FAMILY MEDICINE | Facility: CLINIC | Age: 50
End: 2020-07-21

## 2020-07-21 DIAGNOSIS — F90.0 ATTENTION DEFICIT HYPERACTIVITY DISORDER (ADHD), PREDOMINANTLY INATTENTIVE TYPE: ICD-10-CM

## 2020-08-28 ENCOUNTER — COMMUNICATION - HEALTHEAST (OUTPATIENT)
Dept: FAMILY MEDICINE | Facility: CLINIC | Age: 50
End: 2020-08-28

## 2020-08-28 DIAGNOSIS — F90.0 ATTENTION DEFICIT HYPERACTIVITY DISORDER (ADHD), PREDOMINANTLY INATTENTIVE TYPE: ICD-10-CM

## 2020-10-21 ENCOUNTER — COMMUNICATION - HEALTHEAST (OUTPATIENT)
Dept: FAMILY MEDICINE | Facility: CLINIC | Age: 50
End: 2020-10-21

## 2020-10-21 DIAGNOSIS — F90.0 ATTENTION DEFICIT HYPERACTIVITY DISORDER (ADHD), PREDOMINANTLY INATTENTIVE TYPE: ICD-10-CM

## 2020-11-07 ENCOUNTER — COMMUNICATION - HEALTHEAST (OUTPATIENT)
Dept: FAMILY MEDICINE | Facility: CLINIC | Age: 50
End: 2020-11-07

## 2020-11-07 DIAGNOSIS — J30.1 SEASONAL ALLERGIC RHINITIS DUE TO POLLEN: ICD-10-CM

## 2020-11-10 RX ORDER — CETIRIZINE HYDROCHLORIDE 10 MG/1
TABLET ORAL
Qty: 90 TABLET | Refills: 1 | Status: SHIPPED | OUTPATIENT
Start: 2020-11-10 | End: 2021-07-16

## 2021-01-29 ENCOUNTER — COMMUNICATION - HEALTHEAST (OUTPATIENT)
Dept: FAMILY MEDICINE | Facility: CLINIC | Age: 51
End: 2021-01-29

## 2021-01-29 DIAGNOSIS — F90.0 ATTENTION DEFICIT HYPERACTIVITY DISORDER (ADHD), PREDOMINANTLY INATTENTIVE TYPE: ICD-10-CM

## 2021-03-31 ENCOUNTER — COMMUNICATION - HEALTHEAST (OUTPATIENT)
Dept: ADMINISTRATIVE | Facility: CLINIC | Age: 51
End: 2021-03-31

## 2021-03-31 DIAGNOSIS — F90.0 ATTENTION DEFICIT HYPERACTIVITY DISORDER (ADHD), PREDOMINANTLY INATTENTIVE TYPE: ICD-10-CM

## 2021-03-31 RX ORDER — DEXTROAMPHETAMINE SACCHARATE, AMPHETAMINE ASPARTATE MONOHYDRATE, DEXTROAMPHETAMINE SULFATE AND AMPHETAMINE SULFATE 5; 5; 5; 5 MG/1; MG/1; MG/1; MG/1
40 CAPSULE, EXTENDED RELEASE ORAL EVERY MORNING
Qty: 60 CAPSULE | Refills: 0 | Status: SHIPPED | OUTPATIENT
Start: 2021-03-31 | End: 2022-11-29

## 2021-05-23 ENCOUNTER — HEALTH MAINTENANCE LETTER (OUTPATIENT)
Age: 51
End: 2021-05-23

## 2021-05-26 ASSESSMENT — PATIENT HEALTH QUESTIONNAIRE - PHQ9: SUM OF ALL RESPONSES TO PHQ QUESTIONS 1-9: 5

## 2021-05-27 NOTE — TELEPHONE ENCOUNTER
Controlled Substance Refill Request  Medication Name:   Requested Prescriptions     Pending Prescriptions Disp Refills     dextroamphetamine-amphetamine (ADDERALL XR) 20 MG 24 hr capsule 60 capsule 0     Sig: Take 2 capsules (40 mg total) by mouth every morning.     Date Last Fill: 01/15/2019  Pharmacy: [facility]     Submit electronically to pharmacy  Controlled Substance Agreement Date Scanned:   Encounter-Level CSA Scan Date - 08/14/2017:    Scan on 8/16/2017  2:18 PM: ADDERALL (below)         Last office visit with prescriber/PCP: 1/15/2019 Issa Lanza MD OR same dept: 1/15/2019 Issa Lanza MD OR same specialty: 1/15/2019 Issa Lanza MD  Last physical: 12/7/2018 Last MTM visit: Visit date not found

## 2021-05-27 NOTE — TELEPHONE ENCOUNTER
Patient notified. The patient verbalizes understanding of provider/CSS instructions for follow-up and continued care per provider message.

## 2021-05-27 NOTE — TELEPHONE ENCOUNTER
Fax received from [facility] pharmacy requesting Prior Authorization    Medication Name: Amphetamine ER 20mg capsules    Insurance Plan:    PBM: Jamila   Patient ID Number: [id number]    Please start PA process

## 2021-05-27 NOTE — TELEPHONE ENCOUNTER
Central PA team  [phone]  Pool: HE PA MED ([id number])          PA has been initiated.       PA form completed and faxed insurance via Cover My Meds     Key:  [id number] - PA Case ID: [id number]     Medication:  Amphetamine-Dextroamphet ER 20MG OR CP24    Insurance: [employer]         Response will be received via fax and may take up to 5-10 business days depending on plan

## 2021-05-29 NOTE — TELEPHONE ENCOUNTER
Controlled Substance Refill Request  Medication Name:   Requested Prescriptions     Pending Prescriptions Disp Refills     dextroamphetamine-amphetamine (ADDERALL XR) 20 MG 24 hr capsule 60 capsule 0     Sig: Take 2 capsules (40 mg total) by mouth every morning.     Date Last Fill: 4/9/19  Pharmacy: [facility]      Submit electronically to pharmacy  Controlled Substance Agreement Date Scanned:   Encounter-Level CSA Scan Date - 08/14/2017:    Scan on 8/16/2017  2:18 PM: ADDERALL (below)         Last office visit with prescriber/PCP: 1/15/2019 Issa Lanza MD OR same dept: 1/15/2019 Issa Lanza MD OR same specialty: 1/15/2019 Issa Lanza MD  Last physical: 12/7/2018 Last MTM visit: Visit date not found

## 2021-05-30 NOTE — TELEPHONE ENCOUNTER
Refill Approved    Rx renewed per Medication Renewal Policy. Medication was last renewed on 10/30/18.    Dayan Perales, [facility] Triage/Med Refill 7/13/2019     Requested Prescriptions   Pending Prescriptions Disp Refills     hydroCHLOROthiazide (HYDRODIURIL) 25 MG tablet [Pharmacy Med Name: HYDROCHLOROTHIAZIDE 25 MG TAB] 90 tablet 1     Sig: TAKE 1 TABLET BY MOUTH EVERY DAY       Diuretics/Combination Diuretics Refill Protocol  Passed - 7/13/2019  8:31 AM        Passed - Visit with PCP or prescribing provider visit in past 12 months     Last office visit with prescriber/PCP: 7/9/2019 Issa Lanza MD OR same dept: 7/9/2019 Issa Lanza MD OR same specialty: 7/9/2019 Issa Lanza MD  Last physical: 12/7/2018 Last MTM visit: Visit date not found   Next visit within 3 mo: Visit date not found  Next physical within 3 mo: Visit date not found  Prescriber OR PCP: Issa Lanza MD  Last diagnosis associated with med order: 1. HTN (hypertension)  - hydroCHLOROthiazide (HYDRODIURIL) 25 MG tablet [Pharmacy Med Name: HYDROCHLOROTHIAZIDE 25 MG TAB]; TAKE 1 TABLET BY MOUTH EVERY DAY  Dispense: 90 tablet; Refill: 1    If protocol passes may refill for 12 months if within 3 months of last provider visit (or a total of 15 months).             Passed - Serum Potassium in past 12 months      Lab Results   Component Value Date    Potassium 4.1 12/07/2018             Passed - Serum Sodium in past 12 months      Lab Results   Component Value Date    Sodium 142 12/07/2018             Passed - Blood pressure on file in past 12 months     BP Readings from Last 1 Encounters:   07/09/19 108/68             Passed - Serum Creatinine in past 12 months      Creatinine   Date Value Ref Range Status   12/07/2018 0.94 0.70 - 1.30 mg/dL Final

## 2021-05-30 NOTE — PROGRESS NOTES
ASSESSMENT and plan   1. Attention deficit hyperactivity disorder (ADHD), predominantly inattentive type  Taking Adderall daily this dose is working well for him.  No trouble at work he is able to tolerate the medication without any side effects.  Follow-up recommended in 6 months.  - dextroamphetamine-amphetamine (ADDERALL XR) 20 MG 24 hr capsule; Take 2 capsules (40 mg total) by mouth every morning.  Dispense: 60 capsule; Refill: 0    2. Moderate episode of recurrent major depressive disorder (H)    On Cymbalta he has a good work life balance exercising regularly recently went on a vacation.  Continue current dose    3. Essential hypertension    Blood pressure well controlled continue antihypertensive at current dose she is normotensive    There are no Patient Instructions on file for this visit.    No orders of the defined types were placed in this encounter.    Medications Discontinued During This Encounter   Medication Reason     dextroamphetamine-amphetamine (ADDERALL XR) 20 MG 24 hr capsule Reorder       No follow-ups on file.    CHIEF COMPLAINT:  Chief Complaint   Patient presents with     Follow-up       HISTORY OF PRESENT ILLNESS:  Teddy is a 49 y.o. male who is here for follow-up he is major depression, ADHD and essential hypertension.  He reports his been feeling well.  He recently returned from a vacation where he was hiking up to 6 to 7 miles per day.  He reports that he is been sleeping well and no longer has any symptoms of depression.  He reports that once he paid the co-pay for his Adderall they did prescribe to capsules per day.    REVIEW OF SYSTEMS:     According to patient 12 point review of  All other systems are negative.    PFSH:  Reviewed works as a   TOBACCO USE:  Social History     Tobacco Use   Smoking Status Never Smoker   Smokeless Tobacco Never Used       VITALS:  Vitals:    07/09/19 0755   BP: 108/68   Pulse: 86   Resp: 18   Temp: 98.5  F (36.9  C)   TempSrc: Oral  "  SpO2: 94%   Weight: (!) 233 lb 7 oz (105.9 kg)   Height: 6' 1.25\" (1.861 m)     Wt Readings from Last 3 Encounters:   07/09/19 (!) 233 lb 7 oz (105.9 kg)   01/15/19 (!) 228 lb (103.4 kg)   12/07/18 (!) 244 lb 3 oz (110.8 kg)       PHYSICAL EXAM:  Interactive male sitting comes in exam room no acute distress  HEENT neck supple mucous membranes moist oral cavity shows no exudate no erythema  CVS regular rate and rhythm no murmurs rubs gallops appreciated  Respiratory system clear to auscultation equal breath sounds no wheeze no crackles  Psych well-groomed oriented affect is normal.    DATA REVIEWED:  Additional History from Old Records Summarized (2): 0  Decision to Obtain Records (1): 0  Radiology Tests Summarized or Ordered (1): 0  Labs Reviewed or Ordered (1): 0  Medicine Test Summarized or Ordered (1): 0  Independent Review of EKG or X-RAY(2 each): 0    The visit lasted a total of 25 minutes face to face with the patient. Over 50% of the time was spent counseling and educating the patient about   Hypertension ADHD and depression.    MEDICATIONS:  Current Outpatient Medications   Medication Sig Dispense Refill     dextroamphetamine-amphetamine (ADDERALL XR) 20 MG 24 hr capsule Take 2 capsules (40 mg total) by mouth every morning. 60 capsule 0     DULoxetine (CYMBALTA) 30 MG capsule Take 1 capsule (30 mg total) by mouth daily. 90 capsule 1     hydroCHLOROthiazide (HYDRODIURIL) 25 MG tablet Take 1 tablet (25 mg total) by mouth daily. 90 tablet 1     dextroamphetamine-amphetamine (ADDERALL XR) 20 MG 24 hr capsule Take 2 capsules (40 mg total) by mouth every morning. 60 capsule 0     No current facility-administered medications for this visit.        Please note that this clinical encounter uses voice recognition software, there may be typographical errors present    "

## 2021-05-31 VITALS — WEIGHT: 231.06 LBS | BODY MASS INDEX: 29.65 KG/M2 | HEIGHT: 74 IN

## 2021-05-31 VITALS — BODY MASS INDEX: 30.55 KG/M2 | WEIGHT: 231.56 LBS

## 2021-05-31 VITALS — WEIGHT: 227.31 LBS | HEIGHT: 73 IN | BODY MASS INDEX: 30.13 KG/M2

## 2021-05-31 VITALS — WEIGHT: 236 LBS | HEIGHT: 73 IN | BODY MASS INDEX: 31.28 KG/M2

## 2021-05-31 NOTE — TELEPHONE ENCOUNTER
Controlled Substance Refill Request  Medication Name:   Requested Prescriptions     Pending Prescriptions Disp Refills     dextroamphetamine-amphetamine (ADDERALL XR) 20 MG 24 hr capsule 60 capsule 0     Sig: Take 2 capsules (40 mg total) by mouth every morning.     Date Last Fill: 07/09/19  Pharmacy:   [id number] - [facility], [place] - [address] [place] [address]   Submit electronically to pharmacy  Controlled Substance Agreement Date Scanned:   Encounter-Level CSA Scan Date - 08/14/2017:    Scan on 8/16/2017  2:18 PM: ADDERALL (below)         Last office visit with prescriber/PCP: 7/9/2019 Issa Lanza MD OR same dept: 7/9/2019 Issa Lanza MD OR same specialty: 7/9/2019 Issa Lanza MD  Last physical: 12/7/2018 Last MTM visit: Visit date not found

## 2021-06-01 VITALS — WEIGHT: 230 LBS | BODY MASS INDEX: 30.48 KG/M2 | HEIGHT: 73 IN

## 2021-06-01 NOTE — TELEPHONE ENCOUNTER
Controlled Substance Refill Request  Medication Name:   Requested Prescriptions     Pending Prescriptions Disp Refills     dextroamphetamine-amphetamine (ADDERALL XR) 20 MG 24 hr capsule 60 capsule 0     Sig: Take 2 capsules (40 mg total) by mouth every morning.     Date Last Fill: 8/15/2019  [facility]      Submit electronically to pharmacy  Controlled Substance Agreement Date Scanned:   Encounter-Level CSA Scan Date - 08/14/2017:    Scan on 8/16/2017  2:18 PM: ADDERALL (below)         Last office visit with prescriber/PCP: 7/9/2019 Issa Lanza MD OR same dept: 7/9/2019 Issa Lanza MD OR same specialty: 7/9/2019 Issa Lanza MD  Last physical: 12/7/2018 Last MTM visit: Visit date not found

## 2021-06-02 VITALS — WEIGHT: 244.19 LBS | HEIGHT: 73 IN | BODY MASS INDEX: 32.36 KG/M2

## 2021-06-02 VITALS — WEIGHT: 228 LBS | HEIGHT: 73 IN | BODY MASS INDEX: 30.22 KG/M2

## 2021-06-02 NOTE — TELEPHONE ENCOUNTER
Please remove duplicate orders from patient's medication list.        Controlled Substance Refill Request  Medication Name:   Requested Prescriptions     Pending Prescriptions Disp Refills     dextroamphetamine-amphetamine (ADDERALL XR) 20 MG 24 hr capsule 60 capsule 0     Sig: Take 2 capsules (40 mg total) by mouth every morning.     Date Last Fill: 9/13/2019  [id number]      Submit electronically to pharmacy  Controlled Substance Agreement Date Scanned:   Encounter-Level CSA Scan Date - 08/14/2017:    Scan on 8/16/2017  2:18 PM: ADDERALL       Last office visit with prescriber/PCP: 7/9/2019 Issa Lanza MD OR same dept: 7/9/2019 Issa Lanza MD OR same specialty: 7/9/2019 Issa Lanza MD  Last physical: 12/7/2018 Last MTM visit: Visit date not found

## 2021-06-03 VITALS — WEIGHT: 233.44 LBS | BODY MASS INDEX: 30.94 KG/M2 | HEIGHT: 73 IN

## 2021-06-03 VITALS
BODY MASS INDEX: 32.64 KG/M2 | HEIGHT: 73 IN | HEART RATE: 96 BPM | TEMPERATURE: 98.2 F | SYSTOLIC BLOOD PRESSURE: 118 MMHG | DIASTOLIC BLOOD PRESSURE: 86 MMHG | OXYGEN SATURATION: 95 % | WEIGHT: 246.3 LBS

## 2021-06-03 NOTE — TELEPHONE ENCOUNTER
Controlled Substance Refill Request  Medication Name:   Requested Prescriptions     Pending Prescriptions Disp Refills     dextroamphetamine-amphetamine (ADDERALL XR) 20 MG 24 hr capsule 60 capsule 0     Sig: Take 2 capsules (40 mg total) by mouth every morning.     Date Last Fill: 10/16/2019  Pharmacy: [facility]      Submit electronically to pharmacy  Controlled Substance Agreement Date Scanned:   Encounter-Level CSA Scan Date - 08/14/2017:    Scan on 8/16/2017  2:18 PM: ADDERALL       Last office visit with prescriber/PCP: 7/9/2019 Issa Lanza MD OR same dept: 7/9/2019 Issa Lanza MD OR same specialty: 7/9/2019 Issa Lanza MD  Last physical: 12/7/2018 Last MTM visit: Visit date not found

## 2021-06-04 NOTE — TELEPHONE ENCOUNTER
Refill Approved    Rx renewed per Medication Renewal Policy. Medication was last renewed on 12/13/19.    Tiffanie Rivers, [facility] Triage/Med Refill 1/5/2020     Requested Prescriptions   Pending Prescriptions Disp Refills     cetirizine (ZYRTEC) 10 MG tablet [Pharmacy Med Name: CETIRIZINE HCL 10 MG TABLET] 30 tablet 2     Sig: TAKE 1 TABLET BY MOUTH EVERY DAY       Antihistamine Refill Protocol Passed - 1/5/2020 10:32 AM        Passed - Patient has had office visit/physical in last year     Last office visit with prescriber/PCP: 7/9/2019 Issa Lanza MD OR same dept: 7/9/2019 Issa Lanza MD OR same specialty: 7/9/2019 Issa Lanza MD  Last physical: 12/13/2019 Last MTM visit: Visit date not found   Next visit within 3 mo: Visit date not found  Next physical within 3 mo: Visit date not found  Prescriber OR PCP: Issa Lanza MD  Last diagnosis associated with med order: 1. Seasonal allergic rhinitis due to pollen  - cetirizine (ZYRTEC) 10 MG tablet [Pharmacy Med Name: CETIRIZINE HCL 10 MG TABLET]; TAKE 1 TABLET BY MOUTH EVERY DAY  Dispense: 30 tablet; Refill: 2    If protocol passes may refill for 12 months if within 3 months of last provider visit (or a total of 15 months).

## 2021-06-04 NOTE — TELEPHONE ENCOUNTER
Controlled Substance Refill Request  Medication Name:   Requested Prescriptions     Pending Prescriptions Disp Refills     dextroamphetamine-amphetamine (ADDERALL XR) 20 MG 24 hr capsule 60 capsule 0     Sig: Take 2 capsules (40 mg total) by mouth every morning.   Date Last Fill: 11/19/19  [id number]      Submit electronically to pharmacy  Controlled Substance Agreement Date Scanned:   Encounter-Level CSA Scan Date - 08/14/2017:    Scan on 8/16/2017  2:18 PM: ADDERALL       Last office visit with prescriber/PCP: 7/9/2019 Issa Lanza MD OR same dept: 7/9/2019 Issa Lanza MD OR same specialty: 7/9/2019 Issa Lanza MD  Last physical: 12/13/2019 Last MTM visit: Visit date not found

## 2021-06-04 NOTE — TELEPHONE ENCOUNTER
2. Attention deficit hyperactivity disorder (ADHD), predominantly inattentive type  Symptoms are stable.

## 2021-06-05 NOTE — TELEPHONE ENCOUNTER
Controlled Substance Refill Request  Medication Name:   Requested Prescriptions     Pending Prescriptions Disp Refills     dextroamphetamine-amphetamine (ADDERALL XR) 20 MG 24 hr capsule 60 capsule 0     Sig: Take 2 capsules (40 mg total) by mouth every morning.     Date Last Fill: 12/26/2019  Requested Pharmacy: [id number] - [address]  Submit electronically to pharmacy  Controlled Substance Agreement on file:   Encounter-Level CSA Scan Date - 08/14/2017:    Scan on 8/16/2017  2:18 PM: ADDERALL        Last office visit:  12/13/2019

## 2021-06-06 NOTE — TELEPHONE ENCOUNTER
Refill Approved    Rx renewed per Medication Renewal Policy. Medication was last renewed on 2/7/19.    Faby Murphy, [facility] Triage/Med Refill 2/19/2020     Requested Prescriptions   Pending Prescriptions Disp Refills     DULoxetine (CYMBALTA) 30 MG capsule [Pharmacy Med Name: DULOXETINE HCL DR 30 MG CAP] 90 capsule 1     Sig: TAKE 1 CAPSULE BY MOUTH EVERY DAY       Tricyclics/Misc Antidepressant/Antianxiety Meds Refill Protocol Passed - 2/15/2020  9:17 AM        Passed - PCP or prescribing provider visit in last year     Last office visit with prescriber/PCP: 7/9/2019 Issa Lanza MD OR same dept: 7/9/2019 Issa Lanza MD OR same specialty: 7/9/2019 Issa Lanza MD  Last physical: 12/13/2019 Last MTM visit: Visit date not found   Next visit within 3 mo: Visit date not found  Next physical within 3 mo: Visit date not found  Prescriber OR PCP: Issa Lanza MD  Last diagnosis associated with med order: 1. Moderate episode of recurrent major depressive disorder (H)  - DULoxetine (CYMBALTA) 30 MG capsule [Pharmacy Med Name: DULOXETINE HCL DR 30 MG CAP]; TAKE 1 CAPSULE BY MOUTH EVERY DAY  Dispense: 90 capsule; Refill: 1    If protocol passes may refill for 12 months if within 3 months of last provider visit (or a total of 15 months).

## 2021-06-06 NOTE — TELEPHONE ENCOUNTER
Controlled Substance Refill Request  Medication Name:   Requested Prescriptions     Pending Prescriptions Disp Refills     dextroamphetamine-amphetamine (ADDERALL XR) 20 MG 24 hr capsule 60 capsule 0     Sig: Take 2 capsules (40 mg total) by mouth every morning.     Date Last Fill: 12/26/19  [facility]  [facility] to [facility]  Controlled Substance Agreement on file:   Encounter-Level CSA Scan Date - 08/14/2017:    Scan on 8/16/2017  2:18 PM: ADDERALL        Last office visit:  07/09/19

## 2021-06-06 NOTE — TELEPHONE ENCOUNTER
Expand All Collapse All    ASSESSMENT and plan   1. Attention deficit hyperactivity disorder (ADHD), predominantly inattentive type  Taking Adderall daily this dose is working well for him.  No trouble at work he is able to tolerate the medication without any side effects.  Follow-up recommended in 6 months.  - dextroamphetamine-amphetamine (ADDERALL XR) 20 MG 24 hr capsule; Take 2 capsules (40 mg total) by mouth every morning.  Dispense: 60 capsule; Refill: 0

## 2021-06-07 NOTE — TELEPHONE ENCOUNTER
Controlled Substance Refill Request  Medication Name:   Requested Prescriptions     Pending Prescriptions Disp Refills     dextroamphetamine-amphetamine (ADDERALL XR) 20 MG 24 hr capsule 60 capsule 0     Sig: Take 2 capsules (40 mg total) by mouth every morning.     Date Last Fill: 03/05/20  [facility]  [facility] to [facility]  Controlled Substance Agreement on file:   Encounter-Level CSA Scan Date - 08/14/2017:    Scan on 8/16/2017  2:18 PM: ADDERALL        Last office visit:  12/13/19

## 2021-06-08 NOTE — TELEPHONE ENCOUNTER
Last office visit: 07/09/2019  Last refill: 03/05/2020 and 03/31/2020  Last lab check: 12/20/2019  Next appointment: 06/19/2020    Chart reviewed. Please review findings below.     ASSESSMENT and plan   1. Attention deficit hyperactivity disorder (ADHD), predominantly inattentive type  Taking Adderall daily this dose is working well for him.  No trouble at work he is able to tolerate the medication without any side effects.  Follow-up recommended in 6 months.  - dextroamphetamine-amphetamine (ADDERALL XR) 20 MG 24 hr capsule; Take 2 capsules (40 mg total) by mouth every morning.  Dispense: 60 capsule; Refill: 0

## 2021-06-08 NOTE — TELEPHONE ENCOUNTER
Controlled Substance Refill Request  Medication Name:   Requested Prescriptions     Pending Prescriptions Disp Refills     dextroamphetamine-amphetamine (ADDERALL XR) 20 MG 24 hr capsule 60 capsule 0     Sig: Take 2 capsules (40 mg total) by mouth every morning.     Date Last Fill: 05/13/20  [facility]  [facility] to [facility]  Controlled Substance Agreement on file:   Encounter-Level CSA Scan Date - 08/14/2017:    Scan on 8/16/2017  2:18 PM: ADDERALL        Last office visit:  12/13/19

## 2021-06-08 NOTE — TELEPHONE ENCOUNTER
Prior Authorization Request  Who s requesting:  Pharmacy  [facility] Name [facility] Location: [id number], [address]   Medication Name: dextroamphetamine-amphetamine (ADDERALL XR) 20 MG 24 hr capsule     Insurance Plan: [employer]  Insurance Member ID Number:  [id number]  CoverMyMeds Key: N/A  Informed patient that prior authorizations can take up to 10 business days for response:   NA  Okay to leave a detailed message: Yes

## 2021-06-08 NOTE — TELEPHONE ENCOUNTER
Controlled Substance Refill Request  Medication Name:   Requested Prescriptions     Pending Prescriptions Disp Refills     dextroamphetamine-amphetamine (ADDERALL XR) 20 MG 24 hr capsule 60 capsule 0     Sig: Take 2 capsules (40 mg total) by mouth every morning.     Date Last Fill: 3/31/20  [facility]  Submit electronically to pharmacy  Controlled Substance Agreement on file:   Encounter-Level CSA Scan Date - 08/14/2017:    Scan on 8/16/2017  2:18 PM: ADDERALL        Last office visit:  12/13/19

## 2021-06-08 NOTE — TELEPHONE ENCOUNTER
Central PA team  [phone]  Pool: [employer] ([id number])          PA has been initiated.       PA form completed and faxed insurance via Cover My Meds     Key:   [id number] - [id number]     Medication:  Amphetamine-Dextroamphet ER 20MG er capsules    Insurance:  [employer]        Response will be received via fax and may take up to 5-10 business days depending on plan

## 2021-06-09 NOTE — PROGRESS NOTES
"Teddy Ayala is a 50 y.o. male who is being evaluated via a billable telephone visit.      The patient has been notified of following:     \"This telephone visit will be conducted via a call between you and your physician/provider. We have found that certain health care needs can be provided without the need for a physical exam.  This service lets us provide the care you need with a short phone conversation.  If a prescription is necessary we can send it directly to your pharmacy.  If lab work is needed we can place an order for that and you can then stop by our lab to have the test done at a later time.    Telephone visits are billed at different rates depending on your insurance coverage. During this emergency period, for some insurers they may be billed the same as an in-person visit.  Please reach out to your insurance provider with any questions.    If during the course of the call the physician/provider feels a telephone visit is not appropriate, you will not be charged for this service.\"    Patient has given verbal consent to a Telephone visit? Yes    What phone number would you like to be contacted at? [phone]    Patient would like to receive their AVS by AVS Preference: Mail a copy.    Additional provider notes:     50-year-old male following up for hyperlipidemia hypertension and ADHD.  Patient reports that he is been able to take his medications faithfully.  He reports that in the early part of 2020 he had changed his diet drastically and lost 15 pounds and after the pandemic started he was unable to exercise he is restarted exercising and again has lost 5 pounds.  He reports no chest pain no shortness of breath.  He states that he was eating a lot of fast food when he was confined inside but now is eating better.  He notes that his ADHD medication is still working for him.    Assessment/Plan:  1. Hyperlipidemia LDL goal <130  We will recheck a lipid panel is been approximately 6 months.  His last " lipid panel had a total cholesterol almost approaching 300.  - Lipid Cascade; Future    2. Attention deficit hyperactivity disorder (ADHD), predominantly inattentive type  Continue current dose of medication no side effects noted.        Phone call duration:  25 minutes    Issa aguilar md

## 2021-06-09 NOTE — TELEPHONE ENCOUNTER
Controlled Substance Refill Request  Medication Name:   Requested Prescriptions     Pending Prescriptions Disp Refills     dextroamphetamine-amphetamine (ADDERALL XR) 20 MG 24 hr capsule 60 capsule 0     Sig: Take 2 capsules (40 mg total) by mouth every morning.     Date Last Fill: 06/11/2020  Requested Pharmacy: [id number] - [address]   Submit electronically to pharmacy  Controlled Substance Agreement on file:   Encounter-Level CSA Scan Date - 08/14/2017:    Scan on 8/16/2017  2:18 PM: ADDERALL        Last office visit:  06/19/2020

## 2021-06-10 ENCOUNTER — OFFICE VISIT - HEALTHEAST (OUTPATIENT)
Dept: FAMILY MEDICINE | Facility: CLINIC | Age: 51
End: 2021-06-10

## 2021-06-10 DIAGNOSIS — S76.112A STRAIN OF LEFT QUADRICEPS, INITIAL ENCOUNTER: ICD-10-CM

## 2021-06-10 DIAGNOSIS — G47.00 INSOMNIA, UNSPECIFIED TYPE: ICD-10-CM

## 2021-06-10 DIAGNOSIS — F90.0 ATTENTION DEFICIT HYPERACTIVITY DISORDER (ADHD), PREDOMINANTLY INATTENTIVE TYPE: ICD-10-CM

## 2021-06-10 DIAGNOSIS — I10 ESSENTIAL HYPERTENSION: ICD-10-CM

## 2021-06-10 RX ORDER — DEXTROAMPHETAMINE SACCHARATE, AMPHETAMINE ASPARTATE MONOHYDRATE, DEXTROAMPHETAMINE SULFATE AND AMPHETAMINE SULFATE 5; 5; 5; 5 MG/1; MG/1; MG/1; MG/1
40 CAPSULE, EXTENDED RELEASE ORAL EVERY MORNING
Qty: 60 CAPSULE | Refills: 0 | Status: SHIPPED | OUTPATIENT
Start: 2021-06-10 | End: 2021-07-20

## 2021-06-10 ASSESSMENT — PATIENT HEALTH QUESTIONNAIRE - PHQ9: SUM OF ALL RESPONSES TO PHQ QUESTIONS 1-9: 14

## 2021-06-10 NOTE — TELEPHONE ENCOUNTER
Controlled Substance Refill Request  Medication Name:   Requested Prescriptions     Pending Prescriptions Disp Refills     dextroamphetamine-amphetamine (ADDERALL XR) 20 MG 24 hr capsule 60 capsule 0     Sig: Take 2 capsules (40 mg total) by mouth every morning.     Date Last Fill: 7/21/2020  [facility]  [facility] electronically to [facility]  Controlled Substance Agreement on file:   Encounter-Level CSA Scan Date - 08/14/2017:    Scan on 8/16/2017  2:18 PM: ADDERALL        Last office visit:  6/19/2020

## 2021-06-11 ENCOUNTER — COMMUNICATION - HEALTHEAST (OUTPATIENT)
Dept: FAMILY MEDICINE | Facility: CLINIC | Age: 51
End: 2021-06-11

## 2021-06-11 DIAGNOSIS — F90.0 ATTENTION DEFICIT HYPERACTIVITY DISORDER (ADHD), PREDOMINANTLY INATTENTIVE TYPE: ICD-10-CM

## 2021-06-12 NOTE — PROGRESS NOTES
Subjective:   Teddy presents today because of attention deficit disorder.  He has had a long history of ADD.  He has been taking Adderall for this.  He needs a refill on it now as well.  He denies any side effects to the medications.  His sleep pattern seems okay.  His appetite is good.  Sometimes he will get overly tired and sleep more hours than usual.  Prior to starting the Adderall he had a very difficult time concentrating on things.  He was very inefficient with this time.  He works as a  and he feels that the Adderall makes his workday much more productive.         Objective:  HEENT: Pupils equally round and reactive to light.  Pharynx is clear.  Neck: No thyromegaly noted.  Lungs: Lungs are totally clear.  Patient's in no respiratory distress.  Cardiac: There is a regular rhythm present.  No murmur heard.  Neurologic: Patient is alert and oriented.  Speech is clear.  Cranial nerves all are intact.        Assessment:  1.  Attention deficit disorder      Plan:  Patient's Adderall is refilled.  He takes 40 mg daily.  We will check a CMP to ensure her liver and kidney function normal.  He will be called with any lab abnormalities.  Controlled substance agreement signed today.

## 2021-06-12 NOTE — TELEPHONE ENCOUNTER
Refill Approved    Rx renewed per Medication Renewal Policy. Medication was last renewed on 1/5/20.    Faby Murphy, [facility] Triage/Med Refill 11/10/2020     Requested Prescriptions   Pending Prescriptions Disp Refills     cetirizine (ZYRTEC) 10 MG tablet [Pharmacy Med Name: CETIRIZINE HCL 10 MG TABLET] 90 tablet 3     Sig: TAKE 1 TABLET BY MOUTH EVERY DAY       Antihistamine Refill Protocol Passed - 11/7/2020  4:06 PM        Passed - Patient has had office visit/physical in last year     Last office visit with prescriber/PCP: 7/9/2019 Issa Lanza MD OR same dept: Visit date not found OR same specialty: 7/9/2019 Issa Lanza MD  Last physical: 12/13/2019 Last MTM visit: Visit date not found   Next visit within 3 mo: Visit date not found  Next physical within 3 mo: Visit date not found  Prescriber OR PCP: Issa Lanza MD  Last diagnosis associated with med order: 1. Seasonal allergic rhinitis due to pollen  - cetirizine (ZYRTEC) 10 MG tablet [Pharmacy Med Name: CETIRIZINE HCL 10 MG TABLET]; TAKE 1 TABLET BY MOUTH EVERY DAY  Dispense: 90 tablet; Refill: 3    If protocol passes may refill for 12 months if within 3 months of last provider visit (or a total of 15 months).

## 2021-06-12 NOTE — TELEPHONE ENCOUNTER
Controlled Substance Refill Request  Medication Name:   Requested Prescriptions     Pending Prescriptions Disp Refills     dextroamphetamine-amphetamine (ADDERALL XR) 20 MG 24 hr capsule 60 capsule 0     Sig: Take 2 capsules (40 mg total) by mouth every morning.     Date Last Fill: 8/28/2020  [facility]  [facility] to [facility]  Controlled Substance Agreement on file:   Encounter-Level CSA Scan Date - 08/14/2017:    Scan on 8/16/2017  2:18 PM: ADDERALL        Last office visit:  6/19/2020

## 2021-06-13 NOTE — PROGRESS NOTES
ASSESSMENT AND PLAN  1. Preventative health care  Influenza, Seasonal Quad, Preservative Free 36+ Months    Lipid Profile   2. ADHD (attention deficit hyperactivity disorder) discussion today about his ADHD.  He has difficulty sleeping is been diagnosed with depression in the past.  Has tried multiple SSRIs which have not been beneficial he is also been on Effexor.  He is not averse to trying CBT treatment.  He has medication at this time and has signed a controlled substance agreement for the year.  Invited him to work on his sleep hygiene which include exercising at night and seeing if that helps.  If that is not beneficial we would start with an agent to help promote sleep.          Orders Placed This Encounter   Procedures     Influenza, Seasonal Quad, Preservative Free 36+ Months      There are no discontinued medications.    No Follow-up on file.    CHIEF COMPLAINT:  Chief Complaint   Patient presents with     Establish care       HISTORY OF PRESENT ILLNESS:  Teddy is a 47 y.o. male presenting to the clinic today to establish care.     ADHD: He states that when he was in high school and college, he had a 3.8 GPA. He states that he was diagnosed about 10 years ago with ADHD around the same time his son was diagnosed with autism. He notes that he went to a psychologist and he was given an IQ test. He was told that his function and intellegence did not match which his a sign of ADHD.  He has been taking Adderall 40 mg for the past 10 years. He notes issues with organization.     Depression: He notes that his worse episode was about 14 years ago when he was experiencing financial and social concerns. He notes that he has tried several medications including Effexor, Zoloft, Celexa, Lexapro, and Paxil. He notes that his depression improves with exercise.     REVIEW OF SYSTEMS:   He states that his sleep quality is very poor. He will often go to sleep at 1AM and wake up at 5-7 AM on week days. He notes that he often  "will sleep 16 hours on Saturdays.   All other 10 point review of systems are negative.    PFSH:  He has a child with autism (age 18). He has had several issues with violence and possible suicidal tendencies. Currently looking for a group home for him. . Pertinent past, family, social and medical history reviewed.   History   Smoking Status     Never Smoker   Smokeless Tobacco     Never Used       No family history on file.    Social History     Social History     Marital status:      Spouse name: N/A     Number of children: N/A     Years of education: N/A     Occupational History     Not on file.     Social History Main Topics     Smoking status: Never Smoker     Smokeless tobacco: Never Used     Alcohol use Not on file     Drug use: Not on file     Sexual activity: Not on file     Other Topics Concern     Not on file     Social History Narrative       No past surgical history on file.    No Known Allergies    Active Ambulatory Problems     Diagnosis Date Noted     Major Depression, Recurrent      Attention-deficit Hyperactivity Disorder      Resolved Ambulatory Problems     Diagnosis Date Noted     Ankle Injury      Ankle Sprain      Acute pharyngitis      No Additional Past Medical History       VITALS:  Vitals:    09/29/17 0816   BP: 122/90   Patient Site: Right Arm   Patient Position: Sitting   Cuff Size: Adult Regular   Pulse: 70   Temp: 98.1  F (36.7  C)   TempSrc: Oral   SpO2: 96%   Weight: (!) 231 lb 1 oz (104.8 kg)   Height: 6' 1.5\" (1.867 m)     Wt Readings from Last 3 Encounters:   09/29/17 (!) 231 lb 1 oz (104.8 kg)   08/14/17 (!) 236 lb (107 kg)   02/19/15 (!) 226 lb (102.5 kg)     Body mass index is 30.07 kg/(m^2).    PHYSICAL EXAM:  General: Alert, cooperative, no distress, appears stated age  Lungs: Clear to auscultation bilaterally, respirations unlabored  Chest wall: No tenderness or deformity  Heart: Regular rate and rhythm, S1 and S2 normal, no murmur, rub, or gallop  CVS: No edema noted. "   Abdomen: Soft, non tender, bowel sounds active all four quadrants, no masses, no organomegaly.  Neurologic: No tremor, no focal findings.     Psych: Oriented x3. Affect normal.     ADDITIONAL HISTORY SUMMARIZED (2): Reviewed Dr. Lau's note from 8/14/2017 regarding ADHD. Reviewed several past notes from [id number] regarding ADHD.   DECISION TO OBTAIN EXTRA INFORMATION (1): None.   RADIOLOGY TESTS (1): None.  LABS (1): :Labs ordered.   MEDICINE TESTS (1): None.  INDEPENDENT REVIEW (2 each): None.     The visit lasted a total of 20 minutes face to face with the patient. Over 50% of the time was spent counseling and educating the patient about establishing care.     IRajiv, am scribing for and in the presence of, Dr. Lanza.    hitesh BLAKE personally performed the services described in this documentation, as scribed by Rajiv Andres in my presence, and it is both accurate and complete.    MEDICATIONS:  Current Outpatient Prescriptions   Medication Sig Dispense Refill     dextroamphetamine-amphetamine (ADDERALL XR) 20 MG 24 hr capsule Take 2 capsules (40 mg total) by mouth every morning. 60 capsule 0     No current facility-administered medications for this visit.        Total data points:3

## 2021-06-14 NOTE — PROGRESS NOTES
ASSESSMENT:  1. Routine general medical examination at a health care facility anticipatory guidance discussed lipid panel BMP drawn today. Basic Metabolic Panel    Lipid Profile   2. ADHD (attention deficit hyperactivity disorder) is taking his medications faithfully.  No side effects noted     3. Major depressive disorder, recurrent episode     4. Elevated blood pressure reading he has started regular exercise program asked him to cut down his intake of beer.  He will cut down on his sodium rich food.      5.  BPH is noticed a decrease of urine no sense of urgency noted at night no nocturia that significant suggested that he try saw palmetto if it does not work we can revisit this problem in 7 weeks at his next follow-up  PLAN:  There are no Patient Instructions on file for this visit.    No orders of the defined types were placed in this encounter.     There are no discontinued medications.    No Follow-up on file.    CHIEF COMPLAINT:  Chief Complaint   Patient presents with     Annual Exam       HISTORY OF PRESENT ILLNESS:  Teddy is a 47 y.o. male presenting to the clinic today for a physical exam.     Elevated Blood Pressure: His blood pressure today was 132/114. He notes that he has had elevated blood pressure in the past which was corrected with sodium restriction and exercise. He has not been watching his diet recently.     REVIEW OF SYSTEMS:   He notes intermittent runny nose and allergy symptoms.   He notes that his flow of urine has decreased over the past few years.    He denies carpal tunnel like symptoms.   All other 10 point review of systems are negative.    PFSH:  He works out once a week. Pertinent past, family, social and medical history reviewed.   History   Smoking Status     Never Smoker   Smokeless Tobacco     Never Used       No family history on file.    Social History     Social History     Marital status:      Spouse name: N/A     Number of children: N/A     Years of education: N/A  "    Occupational History     Not on file.     Social History Main Topics     Smoking status: Never Smoker     Smokeless tobacco: Never Used     Alcohol use Not on file     Drug use: Not on file     Sexual activity: Not on file     Other Topics Concern     Not on file     Social History Narrative       No past surgical history on file.    No Known Allergies    Active Ambulatory Problems     Diagnosis Date Noted     Major Depression, Recurrent      ADHD (attention deficit hyperactivity disorder)      Resolved Ambulatory Problems     Diagnosis Date Noted     Ankle Injury      Ankle Sprain      Acute pharyngitis      No Additional Past Medical History       VITALS:  Vitals:    11/30/17 1404 11/30/17 1407   BP: (!) 138/120 (!) 132/114   Patient Site: Right Arm Right Arm   Patient Position: Sitting Sitting   Cuff Size: Adult Large Adult Large   Pulse: 93    Temp: 98.6  F (37  C)    TempSrc: Oral    SpO2: 97%    Weight: (!) 227 lb 5 oz (103.1 kg)    Height: 6' 1\" (1.854 m)      Wt Readings from Last 3 Encounters:   11/30/17 (!) 227 lb 5 oz (103.1 kg)   09/29/17 (!) 231 lb 1 oz (104.8 kg)   08/14/17 (!) 236 lb (107 kg)     Body mass index is 29.99 kg/(m^2).    QUALITY MEASURES:  The following high BMI interventions were performed this visit: encouragement to exercise      PHYSICAL EXAM:  General: Alert, cooperative, no distress, appears stated age  Head: Normocephalic, without obvious abnormality, atraumatic  Eyes: PERRL, conjunctiva/cornea clear, EOM's intact, fundi benign, both eyes  Ears: Normal TM's and external ear canals, both ears  Nose:Left inferior turbinate hypertrophy present.  Throat: Lips, mucosa, and tongue normal; teeth and gums normal; +1 tonsils  Musculoskeletal: Back symmetric, no curvature, ROM normal, no CVA tenderness.  Lungs: Clear to auscultation bilaterally, respirations unlabored  Chest wall: No tenderness or deformity  Heart: Regular rate and rhythm, S1 and S2 normal, no murmur, rub, or " gallop  CVS: No edema noted.   Abdomen: Soft, non tender, bowel sounds active all four quadrants, no masses, no organomegaly.  : (Male) no penile lesions or discharge, no testicular masses or tenderness, no hernias  Rectal: Smooth enlarged prostate.   Neurologic: No tremor, no focal findings.   DTRs are normal and symmetric both proximally and distally BUE and BLE.  Strength testing is normal and symetric both proximally and distally BUE and BLE.  Toes downgoing bilaterally.  Normal gait.   Psych: Oriented x3. Affect normal.     ADDITIONAL HISTORY SUMMARIZED (2): None.  DECISION TO OBTAIN EXTRA INFORMATION (1): None.   RADIOLOGY TESTS (1): None.  LABS (1): Labs ordered.   MEDICINE TESTS (1): None.  INDEPENDENT REVIEW (2 each): None.     The visit lasted a total of 15 minutes face to face with the patient. Over 50% of the time was spent counseling and educating the patient about physical exam.     Rajiv BLAKE, am scribing for and in the presence of, Dr. Lanza.    Ihitesh personally performed the services described in this documentation, as scribed by Rajiv Andres in my presence, and it is both accurate and complete.    MEDICATIONS:  Current Outpatient Prescriptions   Medication Sig Dispense Refill     dextroamphetamine-amphetamine (ADDERALL XR) 20 MG 24 hr capsule Take 2 capsules (40 mg total) by mouth every morning. 60 capsule 0     No current facility-administered medications for this visit.        Total data points:1

## 2021-06-14 NOTE — TELEPHONE ENCOUNTER
Refill Request  Did you contact pharmacy: No  Medication name:   Requested Prescriptions     Pending Prescriptions Disp Refills     dextroamphetamine-amphetamine (ADDERALL XR) 20 MG 24 hr capsule 60 capsule 0     Sig: Take 2 capsules (40 mg total) by mouth every morning.     dextroamphetamine-amphetamine (ADDERALL XR) 20 MG 24 hr capsule 60 capsule 0     Sig: Take 2 capsules (40 mg total) by mouth every morning.     Who prescribed the medication: Dr. Lanza   [facility]  Is patient out of medication: Unknown  Patient notified refills processed in 3 business days:  no  Okay to leave a detailed message: no

## 2021-06-15 ENCOUNTER — COMMUNICATION - HEALTHEAST (OUTPATIENT)
Dept: ADMINISTRATIVE | Facility: CLINIC | Age: 51
End: 2021-06-15

## 2021-06-15 NOTE — PROGRESS NOTES
ASSESSMENT AND PLAN:      1. Preventative health care he is scheduled to visit [place] next year but will start updating his immunizations hep B series was completed today and have given him his first hep A. Hepatitis A adult 2 dose IM (19 yr & older)    Hepatitis B Vaccine Age 20 years and above    CANCELED: Hepatitis B vaccine birth through age 19 years IM   2. ADHD (attention deficit hyperactivity disorder) said difficult time refilling his Adderall as his insurance company wants him to get a prior authorization I am going to write a leg a letter of necessity as he has been taking Adderall X are 20 mg 2 capsules per day if he does not take this dose his menstruations affected and he performs poorly his job he also cannot sleep.    3. Major depressive disorder, recurrent episode currently his depression is not an issue but he fears that if he does not get his Adderall he will have a hard time and may lose his job        4.  Hypertension blood pressure elevated today he has hypertension nurse recheck in 3 weeks hydrochlorothiazide started side effects discussed    No orders of the defined types were placed in this encounter.    There are no discontinued medications.    CHIEF COMPLAINT:  Chief Complaint   Patient presents with     Hypertension       HISTORY OF PRESENT ILLNESS:  Teddy is a 47 y.o. male presenting for a follow-up on elevated blood pressure. He has reduced his sodium intake since last visit. He works out about once a week. His blood pressure today is 132/94. He is agreeable to starting a low dose of blood pressure medication.     ADHD: He has not been taking his Adderall XR for the past 18 days. He notes he has not been able to focus at work due to not having his Adderall.     REVIEW OF SYSTEMS:   He has been taking Saw Palmetto.    All other 10 point review of systems are negative.    PFSH:   Pertinent past, family, social and medical history reviewed.     TOBACCO USE:  History   Smoking Status      Never Smoker   Smokeless Tobacco     Never Used       VITALS:  Vitals:    01/18/18 1649 01/18/18 1651   BP: 138/90 (!) 132/94   Patient Site: Right Arm Left Arm   Patient Position: Sitting Sitting   Cuff Size: Adult Large Adult Large   Pulse: 79    Temp: 98.7  F (37.1  C)    TempSrc: Oral    SpO2: 95%    Weight: (!) 231 lb 9 oz (105 kg)      Wt Readings from Last 3 Encounters:   01/18/18 (!) 231 lb 9 oz (105 kg)   11/30/17 (!) 227 lb 5 oz (103.1 kg)   09/29/17 (!) 231 lb 1 oz (104.8 kg)     Body mass index is 30.55 kg/(m^2).    PHYSICAL EXAM:  General: Alert, cooperative, no distress, appears stated age  Head: Normocephalic, without obvious abnormality, atraumatic  Lungs: Clear to auscultation bilaterally, respirations unlabored  Chest wall: No tenderness or deformity  Heart: Regular rate and rhythm, S1 and S2 normal, no murmur, rub, or gallop  CVS: No edema noted.   Neurologic: No tremor, no focal findings.     Psych: Oriented x3. Affect normal.     DATA REVIEWED:  Additional History from Old Records Summarized (2): None  Decision to Obtain Records (1): None  Radiology Tests Summarized or Ordered (1): none  Labs Reviewed or Ordered (1): None  Medicine Test Summarized or Ordered (1): None  Independent Review of EKG or X-RAY(2 each): None    The visit lasted a total of 10 minutes face to face with the patient. Over 50% of the time was spent counseling and educating the patient about hypertension.     I, Rajiv Andres, am scribing for and in the presence of, Dr. Lanza.    Ihitesh, personally performed the services described in this documentation, as scribed by Rajiv Andres in my presence, and it is both accurate and complete.      MEDICATIONS:  Current Outpatient Prescriptions   Medication Sig Dispense Refill     dextroamphetamine-amphetamine (ADDERALL XR) 20 MG 24 hr capsule Take 2 capsules (40 mg total) by mouth every morning. 60 capsule 0     No current facility-administered medications for this  visit.        Total Data Points: 0

## 2021-06-16 PROBLEM — I10 HYPERTENSION: Status: ACTIVE | Noted: 2018-01-18

## 2021-06-16 PROBLEM — G47.00 INSOMNIA, UNSPECIFIED TYPE: Status: ACTIVE | Noted: 2021-06-10

## 2021-06-16 NOTE — TELEPHONE ENCOUNTER
Telephone Encounter by Shazia Shahid at 4/12/2019  2:47 PM     Author: Shazia Shahid Service: -- Author Type: --    Filed: 4/12/2019  2:48 PM Encounter Date: 4/10/2019 Status: Signed    : Shazia Shahid APPROVED:    Approval start date:04/12/2019  Approval end date:04/12/2020    Pharmacy has been notified of approval and will contact patient when medication is ready for pickup.

## 2021-06-16 NOTE — TELEPHONE ENCOUNTER
Telephone Encounter by Shazia Shahid at 4/11/2019  9:03 AM     Author: Shazia Shahid Service: -- Author Type: --    Filed: 4/11/2019  9:05 AM Encounter Date: 4/10/2019 Status: Signed    : Shazia Shahid       PRIOR AUTHORIZATION DENIED    Denial Rational: Insurance will only cover a maximum of one capsule per day.             Appeal Information: Must provide adequate scientific literature, peer-reviewed medical literature, or national compendia supporting the the use of of higher doses.

## 2021-06-16 NOTE — TELEPHONE ENCOUNTER
Reason for Call:  Medication or medication refill:    Do you use a [facility]?  Name of the pharmacy and phone number for the current request: [facility]/PHARMACY #[id number] - [place], [place] - [address]    Name of the medication requested: dextroamphetamine-amphetamine (ADDERALL XR) 20 MG 24 hr capsule    Other request: pt still have a few pills left    Can we leave a detailed message on this number? Yes    Phone number patient can be reached at:   Cell number on file:    Telephone Information:   Mobile [phone]       Best Time: anytime    Call taken on 3/31/2021 at 10:45 AM by Darryl Acevedo

## 2021-06-17 NOTE — TELEPHONE ENCOUNTER
Telephone Encounter by Shazia Shahid at 5/26/2020  8:17 AM     Author: Shazia Shahid Service: -- Author Type: --    Filed: 5/26/2020  8:18 AM Encounter Date: 5/14/2020 Status: Signed    : Shazia Shahid APPROVED:  Appeal approved.     Approval start date: 05/14/2020  Approval end date:  05/14/2021    Pharmacy has been notified of approval and will contact patient when medication is ready for pickup.

## 2021-06-17 NOTE — TELEPHONE ENCOUNTER
Telephone Encounter by Shazia Shahid at 5/21/2020  2:48 PM     Author: Shazia Shahid Service: -- Author Type: --    Filed: 5/21/2020  2:50 PM Encounter Date: 5/14/2020 Status: Signed    : Shazia Shahid       PRIOR AUTHORIZATION DENIED    Denial Rational: Denied for 2 capsules daily          Appeal Information: If provider would like to appeal please provide a letter of medical necessity and route back to the PA team.

## 2021-06-17 NOTE — PROGRESS NOTES
ASSESSMENT AND PLAN:  1. ADHD (attention deficit hyperactivity disorder) informed patient that his prior authorization is been cleared for his Exar dose of Adderall he can take 2 capsules daily.  Given him the  hard copy in my assistant will help authenticate whether this is been approved for him.  He was unaware that this dosage had been approved he had previously been taking the 20 mg once daily and not taking the medication on weekends to save doses    2. Attention deficit hyperactivity disorder (ADHD), predominantly inattentive type  dextroamphetamine-amphetamine (ADDERALL XR) 20 MG 24 hr capsule   3. Hyperlipidemia reviewed ACC guidelines according to risk would benefit from the use of statin he wants to work on his weight by exercising and I will recheck his lipid panel within the next 5 months    4. Anxiety state is dealing with high level anxiety this prevents him from doing daily activities he does struggle with anxiety for many years has not taken any medications for more than 10 years starting him on Zoloft side effects discussed at this point in time he is hesitant to start cognitive therapy      5.  Hypertension well-controlled was H CTZ no side effects noted  CHIEF COMPLAINT:  Chief Complaint   Patient presents with     Follow-up     Medication        HISTORY OF PRESENT ILLNESS:  Teddy is a 48 y.o. male presenting for a follow-up.    ADHD: The insurance company has approved the XR formulation of Adderall. However, he did not know that the XR formulation was approved and told his pharmacy to place the XR on hold. He has not received a refill for a few months. He will  the medication today.     Hypertension: He is taking his HCTZ faithfully. His blood pressure today is 118/82.     REVIEW OF SYSTEMS:   He denies recent illness.    All other 10 point review of systems are negative.    PFSH:  He is experiencing family anxiety. He notes that his anxiety causes him to feel overwhelmed. He feels that  "this has been present intermittently for 15 years. Pertinent past, family, social and medical history reviewed.     TOBACCO USE:  History   Smoking Status     Never Smoker   Smokeless Tobacco     Never Used       VITALS:  Vitals:    04/06/18 0934   BP: 118/82   Pulse: 84   Resp: 16   Temp: 98.6  F (37  C)   TempSrc: Oral   Weight: (!) 230 lb (104.3 kg)   Height: 6' 1\" (1.854 m)     Wt Readings from Last 3 Encounters:   04/06/18 (!) 230 lb (104.3 kg)   01/18/18 (!) 231 lb 9 oz (105 kg)   11/30/17 (!) 227 lb 5 oz (103.1 kg)     Body mass index is 30.34 kg/(m^2).    QUALITY MEASURES:  The following high BMI interventions were performed this visit: encouragement to exercise and dietary management education, guidance, and counseling      PHYSICAL EXAM:  General: Alert, cooperative, no distress, appears stated age  Head: Normocephalic, without obvious abnormality, atraumatic  Lungs: Clear to auscultation bilaterally, respirations unlabored  Chest wall: No tenderness or deformity  Heart: Regular rate and rhythm, S1 and S2 normal, no murmur, rub, or gallop  CVS: No edema noted.   Neurologic: No tremor, no focal findings.    Psych: Oriented x3. Affect normal.     DATA REVIEWED:  Additional History from Old Records Summarized (2): None  Decision to Obtain Records (1): None  Radiology Tests Summarized or Ordered (1): None  Labs Reviewed or Ordered (1): Reviewed Lipid Profile from 11/30/2017.   Medicine Test Summarized or Ordered (1): None  Independent Review of EKG or X-RAY(2 each): None    The visit lasted a total of 25 minutes face to face with the patient. Over 50% of the time was spent counseling and educating the patient about follow-up.  For hypertension, CAD, hyperlipidemia, ADHD and anxiety disorder    Rajiv BLAKE, am scribing for and in the presence of, Dr. Lanza.    Ihitesh personally performed the services described in this documentation, as scribed by Rajiv Andres in my presence, and it is both " accurate and complete.      MEDICATIONS:  Current Outpatient Prescriptions   Medication Sig Dispense Refill     dextroamphetamine-amphetamine (ADDERALL XR) 20 MG 24 hr capsule Take 2 capsules (40 mg total) by mouth every morning. 60 capsule 0     hydroCHLOROthiazide (HYDRODIURIL) 25 MG tablet Take 1 tablet (25 mg total) by mouth daily. 90 tablet 3     No current facility-administered medications for this visit.        Total Data Points: 1

## 2021-06-19 NOTE — LETTER
Letter by Issa Aguilar MD at      Author: Issa Aguilar MD Service: -- Author Type: --    Filed:  Encounter Date: 4/11/2019 Status: (Other)       Regarding Teddy Ayala date of birth 1970 April 11, 2018      Mr. Interiano is a patient under my care.  He suffers from ADHD.  He takes Adderall.  He has been taking 40 mg for years.  On a lower dose of Adderall the patient has significant psychosocial dysfunction he has difficulty concentrating he has memory lapses and cannot work full-time.  He has tried the medication at a lower dose and it is not worked well for him when he takes a total of 2 capsules in the morning at the same time his symptoms are well controlled.  Any further questions regarding this patient please do not hesitate to contact me at my office              Sincerely      Issa aguilar MD

## 2021-06-19 NOTE — LETTER
Letter by Issa Lanza MD at      Author: Issa Lanza MD Service: -- Author Type: --    Filed:  Encounter Date: 4/11/2019 Status: (Other)         Re: Teddy Ayala     date of birth 1970 April 11, 2019             Mr. Interiano  is a patient under my care he has had ADHD for many years he is been taking Adderall at a dose of 40 mg of the extended release variety in the morning.  Lower doses which have been necessitated by pharmaceutical review have not proven to be effective and have resulted in decreased psychosocial functioning and lack of concentration.  He has not been able to work full-time on a lower dose and has significant issues which him further worsen his depression and anxiety please authorize the current dose of the Adderall at 40 mg/day if you have any further questions please do not hesitate to contact me at my office          Sincerely    Nano JAMA

## 2021-06-22 NOTE — PROGRESS NOTES
MALE PREVENTATIVE EXAM    Assessment and Plan:       1. Attention deficit hyperactivity disorder (ADHD), predominantly inattentive type  Currently symptoms are stable Adderall successfully he is taking 2 capsules daily.    2. Essential hypertension  Taking both lisinopril and hydrochlorothiazide.  Recheck blood pressure in 3 weeks states it coming to the doctor's does increase his blood pressure.  Weight gain has been noted.  - Lipid Cascade  - Basic Metabolic Panel    3. Moderate episode of recurrent major depressive disorder (H)    He stopped taking his Zoloft several months ago and the medication is been removed from his list.  He states that the medication just made him feel tired and he did not believe it was helping him PHQ 9 reviewed.  We discussed potential side effects of SSRIs and I placed him on a dose of Cymbalta side effects discussed I will see him again in 3 weeks  - Thyroid Stimulating Hormone (TSH)    4. Routine general medical examination at a health care facility    Anticipatory guidance discussed he is no longer exercising weight gain has been noted because he has been eating to help with his depression.  He got an influenza vaccine today.  We will check a lipid panel.    5. Need for immunization against influenza    - Influenza, Seasonal,Quad Inj, 36+ MOS     Next follow up:  No Follow-up on file.    Immunization Review  Adult Imm Review: Due today, orders placed  BMI: 32  Documented tobacco use.  Website and phone contact for [employer]uit[employer] given to patient in AVS.    I discussed the following with the patient:   Adult Healthy Living: Importance of regular exercise  Healthy nutrition  Weight loss referral options    I have had an Advance Directives discussion with the patient.    Subjective:   Chief Complaint: Teddy Ayala is an 48 y.o. male here for a preventative health visit.     HPI:    On is here for his annual physical.  He reports he has been taking his medications faithfully but he does  forget to take his medications he states he takes his Adderall throughout the week but forgets to take it on weekends.  He also states that he has been forgetting to take his blood pressure medication on the weekend.  He had a tough week he states that he is gained weight over the last few months because he has been eating more regularly.  He is also not been exercising at all.  He says he is given up racquetball and typically stays at home during most weekends.  He states that he feels very fatigued and that when he comes home if he does not have anything to do he typically will fall asleep for about 10 hours every night.  Denies any new stressors in his life except at work.    Healthy Habits  Are you taking a daily aspirin? No  Do you typically exercising at least 40 min, 3-4 times per week?  NO  Do you usually eat at least 4 servings of fruit and vegetables a day, include whole grains and fiber and avoid regularly eating high fat foods? NO  Have you had an eye exam in the past two years? Yes  Do you see a dentist twice per year? Yes  Do you have any concerns regarding sleep? No    Safety Screen  If you own firearms, are they secured in a locked gun cabinet or with trigger locks? The patient does not own any firearms  Do you feel you are safe where you are living?: Yes (12/7/2018  9:57 AM)  Do you feel you are safe in your relationship(s)?: Yes (12/7/2018  9:57 AM)      Review of Systems:      General positive for fatigue   psych positive for apathy negative for suicidal ideation  10 point review of all other systems is negative    Cancer Screening     Patient has no health maintenance due at this time          Patient Care Team:  Issa Lanza MD as PCP - General (Family Medicine)        History     Reviewed By Date/Time Sections Reviewed    Charisma Lopez MA 12/7/2018  9:58 AM Tobacco            Objective:   Vital Signs:   Visit Vitals  BP (!) 126/96   Pulse 67   Temp 98.6  F (37  C) (Oral)   Resp 16   Ht 6'  "1.23\" (1.86 m)   Wt (!) 244 lb 3 oz (110.8 kg)   SpO2 94%   BMI 32.02 kg/m           PHYSICAL EXAM  General appearance: alert, appears stated age and cooperative  Head: Normocephalic, without obvious abnormality, atraumatic  Eyes: conjunctivae/corneas clear. PERRL, EOM's intact. Fundi benign.  Ears: normal TM's and external ear canals both ears  Nose: Nares normal. Septum midline. Mucosa normal. No drainage or sinus tenderness.  Throat: lips, mucosa, and tongue normal; teeth and gums normal  Neck: no adenopathy, no carotid bruit, no JVD, supple, symmetrical, trachea midline and thyroid not enlarged, symmetric, no tenderness/mass/nodules  Back: symmetric, no curvature. ROM normal. No CVA tenderness.  Lungs: clear to auscultation bilaterally  Chest wall: no tenderness  Heart: regular rate and rhythm, S1, S2 normal, no murmur, click, rub or gallop  Abdomen: soft, non-tender; bowel sounds normal; no masses,  no organomegaly  Male genitalia: normal  Extremities: extremities normal, atraumatic, no cyanosis or edema  Pulses: 2+ and symmetric      The 10-year ASCVD risk score (Ida TANVIR Jr., et al., 2013) is: 6.8%    Values used to calculate the score:      Age: 48 years      Sex: Male      Is Non- : No      Diabetic: No      Tobacco smoker: No      Systolic Blood Pressure: 136 mmHg      Is BP treated: Yes      HDL Cholesterol: 33 mg/dL      Total Cholesterol: 247 mg/dL          Additional Screenings Completed Today:   Please note that this clinical encounter uses voice recognition software, there may be typographical errors present    "

## 2021-06-23 NOTE — PROGRESS NOTES
ASSESSMENT AND PLAN:  1. Attention deficit hyperactivity disorder (ADHD), predominantly inattentive type  Currently on Adderall.  Symptoms are stable.  He has an active controlled substance agreement in his chart.  - dextroamphetamine-amphetamine (ADDERALL XR) 20 MG 24 hr capsule; Take 2 capsules (40 mg total) by mouth every morning.  Dispense: 60 capsule; Refill: 0    2. Essential hypertension  Blood pressures well controlled because of his weight loss and an increase in activity I will see him again in 3 months if he can reduce his weight to below 212 pounds he may not require antihypertensives will recheck his blood pressure at his next visit    3. Moderate episode of recurrent major depressive disorder (H)  Huge improvement with the use of duloxetine he feels more energetic is no longer depressed exercising.  He notes no side effects of the medication of weight loss is noted because of increased activity.  He is noticed improved work performance.            No orders of the defined types were placed in this encounter.    Medications Discontinued During This Encounter   Medication Reason     dextroamphetamine-amphetamine (ADDERALL XR) 20 MG 24 hr capsule Reorder     DULoxetine (CYMBALTA) 30 MG capsule Reorder       No Follow-up on file.    CHIEF COMPLAINT:  Chief Complaint   Patient presents with     Follow-up       HISTORY OF PRESENT ILLNESS:  Teddy is a 48 y.o. male with hypertension, depression, and ADHD, who presents to the clinic today for follow up on blood pressure and depression. His blood pressure is well-controlled today. He has been taking hydrochlorothiazide faithfully. The patient has lost 16 pounds since his last visit with me on 12/07/2018, per EMR. He has not been exercising regularly because he wanted to see if his mood improved with initiation of Cymbalta at that visit, since exercise tends to improve his symptoms. Teddy has tolerated Cymbalta well with improvement in his mood. He feels better,  "and has become more functional with more motivation to do things. The patient denies any chest pain or shortness of breath. He has some occasional headaches though thinks these are caffeine withdrawal-related.    I reviewed his labs from 12/07/2018 including normal BMP, lipids remarkable for elevated LDL of 163, normal fasting glucose, and normal TSH.    REVIEW OF SYSTEMS:   General: Positive for intentional weight loss.  Respiratory: Negative for shortness of breath.  CV: Negative for chest pain.  Neuro: Positive for intermittent headaches, likely due to caffeine withdrawal.  Mental: Positive for improved mood. Negative for depression or anxiety.   All other systems are negative.    PFSH:  Reviewed as below.     TOBACCO USE:  Social History     Tobacco Use   Smoking Status Never Smoker   Smokeless Tobacco Never Used       VITALS:  Vitals:    01/15/19 1539   BP: 118/80   Patient Site: Left Arm   Patient Position: Sitting   Cuff Size: Adult Large   Pulse: 98   Resp: 20   Temp: 98.7  F (37.1  C)   TempSrc: Oral   SpO2: 95%   Weight: (!) 228 lb (103.4 kg)   Height: 6' 1.25\" (1.861 m)     Wt Readings from Last 3 Encounters:   01/15/19 (!) 228 lb (103.4 kg)   12/07/18 (!) 244 lb 3 oz (110.8 kg)   04/06/18 (!) 230 lb (104.3 kg)     Body mass index is 29.88 kg/m .    PHYSICAL EXAM:  General: Alert, cooperative, no distress, appears stated age  HEENT: Normocephalic, without obvious abnormality, atraumatic, moist mucous membranes, no cervical lymph enlargement  Lungs: Clear to auscultation bilaterally, respirations unlabored  Heart: Regular rate and rhythm, S1 and S2 normal, no murmur, rub, or gallop  Neurologic:  A & O x 3.  No tremor, no focal findings.   Normal gait.   Mental status: Normal mood and affect.    DATA REVIEWED:  Additional History from Old Records Summarized (2): none  Decision to Obtain Records (1): none  Radiology Tests Summarized or Ordered (1): none  Labs Reviewed or Ordered (1): 12/07/2018 labs showed " normal BMP, lipids remarkable for elevated LDL of 163, normal fasting glucose, and normal TSH.  Medicine Test Summarized or Ordered (1): none  Independent Review of EKG or X-RAY(2 each): none    I, Gifty Hudson, am scribing for and in the presence of, Dr. Lanza.    I, Dr. Lanza, personally performed the services described in this documentation, as scribed by Gifty Hudson in my presence, and it is both accurate and complete.     MEDICATIONS:  Current Outpatient Medications   Medication Sig Dispense Refill     dextroamphetamine-amphetamine (ADDERALL XR) 20 MG 24 hr capsule Take 2 capsules (40 mg total) by mouth every morning. 60 capsule 0     dextroamphetamine-amphetamine (ADDERALL XR) 20 MG 24 hr capsule Take 2 capsules (40 mg total) by mouth every morning. 60 capsule 0     DULoxetine (CYMBALTA) 30 MG capsule Take 1 capsule (30 mg total) by mouth daily. 30 capsule 7     hydroCHLOROthiazide (HYDRODIURIL) 25 MG tablet Take 1 tablet (25 mg total) by mouth daily. 90 tablet 1     No current facility-administered medications for this visit.    .  25 minutes were spent today in this visit, 50% was spent discussing the pathophysiology of depression hypertension.    Total Data Points: 1    Please note that this clinical encounter uses voice recognition software, there may be typographical errors present

## 2021-06-25 NOTE — TELEPHONE ENCOUNTER
Central PA team  [phone]  Pool: HE PA MED ([id number])          Appeal  has been initiated.       PA form completed and faxed insurance via Cover My Meds     Key:  Faxed appeal request on 06/14/21 - I did receive a phone call from [employer] in regards to the appeal.  I did send the information with the CMM case, but I did manually fax it as well.     Medication:  Amphetamine-Dextro ER 20mg (2x20mg)    Insurance:  [employer]        Response will be received via fax and may take up to 5-10 business days depending on plan

## 2021-06-25 NOTE — TELEPHONE ENCOUNTER
Prior Authorization Request    Who s requesting:  Pharmacy     Pharmacy Name and Location: [facility] on [address]     Medication Name: Adderal 20 mg caps - take 2 in am/     INSURANCE ONLY COVERS ONCE A DAY DOSING.  Med does not come in 40 mg cap. So needs a PA.     Insurance Plan: [employer] [place]    Insurance Member ID Number:  [id number]    CoverMyMeds Key: N/A     Informed patient that prior authorizations can take up to 10 business days for response:   No     Okay to leave a detailed message: Yes         Call taken on 6/11/21 at 115 pm by Mercy Yee CMA, CMT

## 2021-06-25 NOTE — TELEPHONE ENCOUNTER
Reason for Call: Provider Communication  Return Phone Number: [phone]  Who is calling:  Dr. Morris  Facility provider is associated with:  [facility]  Reason for call:  Peer to Peer review regarding rx adderall.  Urgency for return call:  end of day  Okay to leave detailed message?:  Yes  Call taken on 6/15/2021 at 3:43 PM by Raghav Harris

## 2021-06-25 NOTE — TELEPHONE ENCOUNTER
Central PA team  [phone]  Pool: HE PA MED ([id number])          PA has been initiated.       PA form completed and faxed insurance via Cover My Meds     Key:  STEWART MEAD (Key: [id number])     Medication:  dextroamphetamine-amphetamine (ADDERALL XR) 20 MG 24 hr capsule    Insurance:  [employer]        Response will be received via fax and may take up to 5-10 business days depending on plan

## 2021-06-25 NOTE — TELEPHONE ENCOUNTER
Received additional question set along with a phone call from an [employer] Rep. Answered clinical questions and also faxed to the number on the form. Will await determination.

## 2021-06-26 NOTE — PROGRESS NOTES
ASSESSMENT and plan  1. Attention deficit hyperactivity disorder (ADHD), predominantly inattentive type  Patient admits that he has not been taking his medication regularly.  He has been sleeping at all times because he has been behind in his work.  I would like him to establish a pattern of taking medication regularly which is worked well for him in the past.  - dextroamphetamine-amphetamine (ADDERALL XR) 20 MG 24 hr capsule; Take 2 capsules (40 mg total) by mouth every morning.  Dispense: 60 capsule; Refill: 0    2. Strain of left quadriceps, initial encounter    4-day history of left leg pain which is intermittent not related to exercise.  No reproducible pain noted today he can use an ice pack and anti-inflammatory if the pain returns.    3. Essential hypertension    He has been taking hydrochlorothiazide no side effects noted, he is normotensive today despite gaining weight I have asked him to start an exercise program.  We will recheck labs at his annual physical    4. Insomnia, unspecified type    Patient's had a longstanding history of insomnia.  He reports that because he has an altered sleep cycle and works at home he does not sleep typically at bedtime.  He reports that sometimes you need to take Naps during the day after he signs in for work and this causes him to sleep for 3 to 4 hours in the day and then stay up at night to completing his work I would like him to set him maximal sleep time of 11 PM he can take melatonin which is worked for him in the past if this does not help with his sleep cycle he should contact me via [facility] and an alternative medication can be prescribed for short-term use the alternatives we discussed today or the use of trazodone.    There are no Patient Instructions on file for this visit.    No orders of the defined types were placed in this encounter.    Medications Discontinued During This Encounter   Medication Reason     dextroamphetamine-amphetamine (ADDERALL XR) 20 MG  24 hr capsule Reorder       No follow-ups on file.    CHIEF COMPLAINT:  Chief Complaint   Patient presents with     Leg Pain       HISTORY OF PRESENT ILLNESS:  Teddy is a 51 y.o. male who is here because he has had left leg pain he also reports that he really has not been very energetic and has been disorganized again.  He reports that he is working from home and during the last 6 to 12 months his house has been quite messy and he has been procrastinating.  He admits that he has not been taking his Adderall regularly.  He also states that sleep is been a major concern for him because he has been napping during the day and catching up at night.  He has had many years of issues with his sleep.  He denies to being depressed but states that he needs to be more focused.  He denies any chest pain but notes he has gained approximately 6 pounds from inactivity.  Both his sons have moved out and he feels bored and he has a very limited friend [place].    REVIEW OF SYSTEMS:   Psych positive for decreased concentration.  Neuro positive for insomnia  General positive for weight gain  All other systems are negative.    PFSH:    Social history reviewed    TOBACCO USE:  Social History     Tobacco Use   Smoking Status Never Smoker   Smokeless Tobacco Never Used   Tobacco Comment    No passive exposure       VITALS:  Vitals:    06/10/21 1150   BP: 120/76   Pulse: 91   Resp: 18   SpO2: 95%   Weight: (!) 252 lb 7 oz (114.5 kg)     Wt Readings from Last 3 Encounters:   06/10/21 (!) 252 lb 7 oz (114.5 kg)   12/13/19 (!) 246 lb 4.8 oz (111.7 kg)   07/09/19 (!) 233 lb 7 oz (105.9 kg)       PHYSICAL EXAM:  Interactive male sitting [place] exam no acute distress  HEENT neck supple mucous members moist oral cavity shows no exudate no erythema  Respiratory system clear to auscultation equal breath sounds no wheeze no crackles  CVS regular rate and rhythm no murmurs no rubs no gallops  Musculoskeletal system no tenderness elicited when I  palpate the superior aspect of the left quadricep.  Neuro power of both quadriceps is 5 out of 5  Psych oriented x3 not agitated grooming is adequate he maintains eye contact    DATA REVIEWED:  Additional History from Old Records Summarized (2): 0  Decision to Obtain Records (1): 0  Radiology Tests Summarized or Ordered (1): 0  Labs Reviewed or Ordered (1): 0  Medicine Test Summarized or Ordered (1): 0  Independent Review of EKG or X-RAY(2 each): 0    The visit lasted a total of 30 minutes .    MEDICATIONS:  Current Outpatient Medications   Medication Sig Dispense Refill     dextroamphetamine-amphetamine (ADDERALL XR) 20 MG 24 hr capsule Take 2 capsules (40 mg total) by mouth every morning. 60 capsule 0     cetirizine (ZYRTEC) 10 MG tablet TAKE 1 TABLET BY MOUTH EVERY DAY 90 tablet 1     dextroamphetamine-amphetamine (ADDERALL XR) 20 MG 24 hr capsule Take 2 capsules (40 mg total) by mouth every morning. 60 capsule 0     DULoxetine (CYMBALTA) 30 MG capsule TAKE 1 CAPSULE BY MOUTH EVERY DAY 90 capsule 2     fluticasone propionate (FLONASE) 50 mcg/actuation nasal spray 2 sprays into each nostril daily. 10 g 11     hydroCHLOROthiazide (HYDRODIURIL) 25 MG tablet TAKE 1 TABLET BY MOUTH EVERY DAY 90 tablet 3     No current facility-administered medications for this visit.

## 2021-06-28 NOTE — PROGRESS NOTES
Progress Notes by Issa Lanza MD at 12/13/2019  1:00 PM     Author: Issa Lanza MD Service: -- Author Type: Physician    Filed: 12/13/2019  1:44 PM Encounter Date: 12/13/2019 Status: Signed    : Issa Lanza MD (Physician)       MALE PREVENTATIVE EXAM    Assessment and Plan:       1. Routine general medical examination at a health care facility  Anticipatory guidance discussed he will return in the future for lab since he is not fasting today.  - Basic Metabolic Panel; Future  - Lipid Cascade; Future    2. Attention deficit hyperactivity disorder (ADHD), predominantly inattentive type  Symptoms are stable.    3. Essential hypertension  Blood pressures well controlled refilling hydrochlorothiazide when necessary    4. Seasonal allergic rhinitis due to pollen  He notes excessive snoring Flonase started  - fluticasone propionate (FLONASE) 50 mcg/actuation nasal spray; 2 sprays into each nostril daily.  Dispense: 10 g; Refill: 11  - cetirizine (ZYRTEC) 10 MG tablet; Take 1 tablet (10 mg total) by mouth daily.  Dispense: 30 tablet; Refill: 2      Major depression recurrent  He stopped his Cymbalta and has not noticed any changes.  PHQ 9 reviewed today    Next follow up:  Return in about 1 year (around 12/13/2020) for Annual physical.    Immunization Review  Adult Imm Review: No immunizations due today  BMI: 32.50    I discussed the following with the patient:   Adult Healthy Living: Importance of regular exercise  Healthy nutrition  Getting adequate sleep  Stress management  Use of seat belts    Subjective:   Chief Complaint: Teddy Ayala is an 49 y.o. male with hypertension, depression, and ADHD, here for a preventative health visit.     HPI: His blood pressure has been well-controlled with hydrochlorothiazide.    Lucy reports increased difficulty falling asleep related to worsening difficulty breathing though his nose at night. He recalls being told last year that he has swollen turbinates in his nose. The  patient believes this has worsened as he is now snoring significantly at night. He is still able to smell okay. Lucy recalls having some trouble breathing through his nose during intense exercise as a kid, and had to breathe through his mouth in order to get enough air.     He has gained weight over the past year which he attributes to eating more. The patient stopped taking Cymbalta, and is doing fine with some minor mood fluctuations. His PHQ-9 score today is 5, stating things are not difficult at all.     He has noticed that his laughter often leads to cough. Laying down will also cause cough.    Lucy reports interest in pursuing a vasectomy. He is looking to get  again though does not want to have more children.     No visual changes, hearing loss, dysphagia, sore throat, shortness of breath, wheezing, chest pain/pressure/discomfort/heaviness, reflux, nausea, vomiting, abdominal pain, constipation, diarrhea, blood in stools or urine, dysuria, other urinary issues, groin pain, back/neck pain, muscle/joint pain, numbness, weakness, headaches, dizziness, lightheadedness, rash, skin lesions, other skin changes, mood changes, depression, anxiety, or difficulty sleeping.     Healthy Habits  Are you taking a daily aspirin? No  Do you typically exercising at least 40 min, 3-4 times per week?  Yes, started recently  Do you usually eat at least 4 servings of fruit and vegetables a day, include whole grains and fiber and avoid regularly eating high fat foods? Yes  Have you had an eye exam in the past two years? NO, last exam was about two years ago  Do you see a dentist twice per year? Yes  Do you have any concerns regarding sleep? YES    Safety Screen  If you own firearms, are they secured in a locked gun cabinet or with trigger locks? The patient declines to answer  Do you feel you are safe where you are living?: Yes (12/13/2019 12:31 PM)  Do you feel you are safe in your relationship(s)?: Yes (12/13/2019  "12:31 PM)      Review of Systems:  Please see above.  The rest of the review of systems are negative for all systems.     Cancer Screening     Patient has no health maintenance due at this time          Patient Care Team:  Issa Lanza MD as PCP - General (Family Medicine)  Issa Lanza MD as Assigned PCP        History     Reviewed By Date/Time Sections Reviewed    Rona Holland LPN 12/13/2019 12:28 PM Tobacco, Alcohol, Drug Use, Sexual Activity            Objective:   Vital Signs:   Visit Vitals  /86 (Patient Site: Right Arm, Patient Position: Sitting, Cuff Size: Adult Large)   Pulse 96   Temp 98.2  F (36.8  C) (Oral)   Ht 6' 1\" (1.854 m)   Wt (!) 246 lb 4.8 oz (111.7 kg)   SpO2 95%   BMI 32.50 kg/m           PHYSICAL EXAM  General: Alert, cooperative, no distress, appears stated age.  Head: Normocephalic, without obvious abnormality, atraumatic.  Eyes: PERRL, conjunctiva/cornea clear, EOM's intact, fundi benign, both eyes.  Ears: Normal TM's and external ear canals, both ears.  Nose: Nares normal, Left inferior nasal turbinate hypertrophy, septum midline, no drainage or sinus tenderness.  Throat: Lips, mucosa, and tongue normal; teeth and gums normal.  Neck: Supple, no cervical lymph node enlargement  Back: Symmetric, no curvature, ROM normal, no CVA tenderness.  Lungs: Clear to auscultation bilaterally, respirations unlabored.  Chest wall: No tenderness or deformity.  Heart: Regular rate and rhythm, S1 and S2 normal, no murmur, rub, or gallop.  Abdomen: Soft, non tender, bowel sounds active all four quadrants, no masses, no organomegaly.  : No penile lesions or discharge, no testicular masses or tenderness, no hernias.  Neurologic:  A & O x 3.  No tremor, no focal findings.   DTRs are normal and symmetric both proximally and distally BUE and BLE.  Strength testing is normal and symetric both proximally and distally BUE and BLE.  Normal gait.   Psychiatric: Normal affect, good eye contact, " well-groomed  Skin: No rash or suspicious lesions noted on exposed skin, non-diaphoretic     The 10-year ASCVD risk score ([place] TANVIR Jr., et al., 2013) is: 5%    Values used to calculate the score:      Age: 49 years      Sex: Male      Is Non- : No      Diabetic: No      Tobacco smoker: No      Systolic Blood Pressure: 118 mmHg      Is BP treated: Yes      HDL Cholesterol: 36 mg/dL      Total Cholesterol: 241 mg/dL         Medication List          Accurate as of December 13, 2019  1:12 PM. If you have any questions, ask your nurse or doctor.            START taking these medications    cetirizine 10 MG tablet  Also known as:  ZyrTEC  INSTRUCTIONS:  Take 1 tablet (10 mg total) by mouth daily.  Started by:  Issa Lanza MD        fluticasone propionate 50 mcg/actuation nasal spray  Also known as:  FLONASE  INSTRUCTIONS:  2 sprays into each nostril daily.  Started by:  Issa Lanza MD           CONTINUE taking these medications    * dextroamphetamine-amphetamine 20 MG 24 hr capsule  Also known as:  ADDERALL XR  INSTRUCTIONS:  Take 2 capsules (40 mg total) by mouth every morning.        * dextroamphetamine-amphetamine 20 MG 24 hr capsule  Also known as:  ADDERALL XR  INSTRUCTIONS:  Take 2 capsules (40 mg total) by mouth every morning.        DULoxetine 30 MG capsule  Also known as:  Cymbalta  INSTRUCTIONS:  Take 1 capsule (30 mg total) by mouth daily.        hydroCHLOROthiazide 25 MG tablet  Also known as:  HYDRODIURIL  INSTRUCTIONS:  Take 1 tablet (25 mg total) by mouth daily.            * This list has 2 medication(s) that are the same as other medications prescribed for you. Read the directions carefully, and ask your doctor or other care provider to review them with you.               Where to Get Your Medications      These medications were sent to [facility]/pharmacy #[id number] - [place], [place] - [address] [address]    Phone:  [phone]     cetirizine 10 MG  tablet    fluticasone propionate 50 mcg/actuation nasal spray         Additional Screenings Completed Today:     Orders Placed This Encounter   Procedures   ? Basic Metabolic Panel     Standing Status:   Future     Standing Expiration Date:   12/13/2020   ? Lipid Cascade     Standing Status:   Future     Standing Expiration Date:   12/13/2020     Order Specific Question:   Fasting is required?     Answer:   Yes      I, Gifty Hudson, am scribing for and in the presence of, Dr. Lanza.    I, Dr. Lanza, personally performed the services described in this documentation, as scribed by Gifty Hudson in my presence, and it is both accurate and complete.     Please note that this clinical encounter uses voice recognition software, there may be typographical errors present.

## 2021-07-03 NOTE — ADDENDUM NOTE
Addendum Note by Lucinda Biggs LPN at 3/15/2018  9:58 AM     Author: Lucinda Biggs LPN Service: -- Author Type: Certified Medical Assistant    Filed: 3/15/2018  9:58 AM Encounter Date: 3/12/2018 Status: Signed    : Lucinda Biggs LPN (Licensed Nurse)    Addended by: LUCINDA BIGGS on: 3/15/2018 09:58 AM        Modules accepted: Orders

## 2021-07-04 NOTE — TELEPHONE ENCOUNTER
Telephone Encounter by Kelly Angeles at 6/14/2021 10:38 AM     Author: Kelly Angeles Service: -- Author Type: Patient Access    Filed: 6/14/2021 10:45 AM Encounter Date: 6/11/2021 Status: Signed    : Kelly Angeles (Patient Access)       PRIOR AUTHORIZATION DENIED    Denial Rational: You have met the criteria to receive this medication. Amphetamine/dextroamphetamine salts 20mg extended-release capsule has been approved through 6/11/2022. However, per your health  plan's criteria, more than 1 capsule per day is covered if you meet the following:    (1) The drug is prescribed or recommended by a mental health specialist.  (2) Medical reference (peer-reviewed medical literature or national compendia) supporting the use of higher doses is provided.    The information provided does not show that you meet the criteria listed above.        Appeal Information: If the provider would like to appeal this denial, please provide a letter of medical necessity and once it has been completed and placed in the patient's chart, notify the [facility] PA Team ([facility] MED [id number]) and the appeal can be initiated on behalf of the patient and provider.  Please also include any therapies that the patient has tried and their outcomes.

## 2021-07-04 NOTE — TELEPHONE ENCOUNTER
Telephone Encounter by Kelly Angeles at 6/16/2021  2:45 PM     Author: Kelly Angeles Service: -- Author Type: Patient Access    Filed: 6/16/2021  2:54 PM Encounter Date: 6/11/2021 Status: Signed    : Kelly Angeles (Patient Access)       Medication Appeal APPROVED:    Approval start date: 06/11/2021  Approval end date:  06/11/2022    Pharmacy has been notified of approval and will contact patient when medication is ready for pickup.

## 2021-07-06 VITALS
DIASTOLIC BLOOD PRESSURE: 76 MMHG | RESPIRATION RATE: 18 BRPM | OXYGEN SATURATION: 95 % | BODY MASS INDEX: 33.31 KG/M2 | HEART RATE: 91 BPM | SYSTOLIC BLOOD PRESSURE: 120 MMHG | WEIGHT: 252.44 LBS

## 2021-07-06 ASSESSMENT — PATIENT HEALTH QUESTIONNAIRE - PHQ9: SUM OF ALL RESPONSES TO PHQ QUESTIONS 1-9: 14

## 2021-07-16 ENCOUNTER — TELEPHONE (OUTPATIENT)
Dept: FAMILY MEDICINE | Facility: CLINIC | Age: 51
End: 2021-07-16

## 2021-07-16 DIAGNOSIS — J30.1 SEASONAL ALLERGIC RHINITIS DUE TO POLLEN: ICD-10-CM

## 2021-07-16 RX ORDER — CETIRIZINE HYDROCHLORIDE 10 MG/1
TABLET ORAL
Qty: 90 TABLET | Refills: 1 | Status: SHIPPED | OUTPATIENT
Start: 2021-07-16 | End: 2021-09-07

## 2021-07-16 NOTE — TELEPHONE ENCOUNTER
Refill Request  Did you contact pharmacy: Yes  Medication name: CETIRIZINE  Who prescribed the medication: Dr. Lanza   [facility]  Is patient out of medication: Yes  Patient notified refills processed in 3 business days:  yes  Okay to leave a detailed message: yes

## 2021-07-19 ENCOUNTER — TELEPHONE (OUTPATIENT)
Dept: FAMILY MEDICINE | Facility: CLINIC | Age: 51
End: 2021-07-19

## 2021-07-19 DIAGNOSIS — F90.0 ATTENTION DEFICIT HYPERACTIVITY DISORDER (ADHD), PREDOMINANTLY INATTENTIVE TYPE: ICD-10-CM

## 2021-07-19 NOTE — TELEPHONE ENCOUNTER
Central Prior Authorization Team   Phone: [phone]    PA Initiation    Medication:   Insurance Company: [employer] ([facility]) - Phone [phone] Fax [phone]  [id number] [place], [place] - [address]  Filling Pharmacy Phone: [phone]  Filling Pharmacy Fax: [phone]  Start Date: 7/19/2021

## 2021-07-19 NOTE — TELEPHONE ENCOUNTER
Prior Authorization Request     Who s requesting:  Patient     [facility] Name and Location[id number], [place] - [address] [place] [address]     Medication Name: cetirizine (ZYRTEC) 10 MG tablet       Insurance Plan: Blue Cross [place] of [place]     Insurance Member ID Number:  [id number]     CoverMyMeds Key: N/A      Informed patient that prior authorizations can take up to 10 business days for response:   No      Okay to leave a detailed message: Yes

## 2021-07-20 DIAGNOSIS — I10 HTN (HYPERTENSION): ICD-10-CM

## 2021-07-20 DIAGNOSIS — I10 ESSENTIAL HYPERTENSION: Primary | ICD-10-CM

## 2021-07-20 RX ORDER — DEXTROAMPHETAMINE SACCHARATE, AMPHETAMINE ASPARTATE MONOHYDRATE, DEXTROAMPHETAMINE SULFATE AND AMPHETAMINE SULFATE 5; 5; 5; 5 MG/1; MG/1; MG/1; MG/1
40 CAPSULE, EXTENDED RELEASE ORAL EVERY MORNING
Qty: 60 CAPSULE | Refills: 0 | Status: SHIPPED | OUTPATIENT
Start: 2021-07-20 | End: 2021-09-07

## 2021-07-20 NOTE — TELEPHONE ENCOUNTER
PRIOR AUTHORIZATION DENIED    Medication: CETIRIZINE-DENIED    Denial Date: 7/19/2021    Denial Rational: PATIENT MUST TRY/FAIL TWO FORMULARY ALTERNATIVES - DESLORATADINE TAB, LEVOCETIRIZINE TAB.        Appeal Information:  IF PATIENT IS UNABLE TO TRY/FAIL ALTERNATIVE(S) PLEASE SUPPLY PA TEAM WITH A LETTER OF MEDICAL NECESSITY WITH CLINICAL REASON.

## 2021-07-22 RX ORDER — HYDROCHLOROTHIAZIDE 25 MG/1
TABLET ORAL
Qty: 90 TABLET | Refills: 3 | Status: SHIPPED | OUTPATIENT
Start: 2021-07-22 | End: 2021-07-27

## 2021-07-23 DIAGNOSIS — I10 ESSENTIAL HYPERTENSION: ICD-10-CM

## 2021-07-27 RX ORDER — HYDROCHLOROTHIAZIDE 25 MG/1
TABLET ORAL
Qty: 90 TABLET | Refills: 3 | Status: SHIPPED | OUTPATIENT
Start: 2021-07-27 | End: 2022-09-20

## 2021-09-07 DIAGNOSIS — J30.1 SEASONAL ALLERGIC RHINITIS DUE TO POLLEN: ICD-10-CM

## 2021-09-07 DIAGNOSIS — F90.0 ATTENTION DEFICIT HYPERACTIVITY DISORDER (ADHD), PREDOMINANTLY INATTENTIVE TYPE: ICD-10-CM

## 2021-09-07 RX ORDER — CETIRIZINE HYDROCHLORIDE 10 MG/1
TABLET ORAL
Qty: 90 TABLET | Refills: 1 | Status: SHIPPED | OUTPATIENT
Start: 2021-09-07 | End: 2021-10-12

## 2021-09-07 RX ORDER — DEXTROAMPHETAMINE SACCHARATE, AMPHETAMINE ASPARTATE MONOHYDRATE, DEXTROAMPHETAMINE SULFATE AND AMPHETAMINE SULFATE 5; 5; 5; 5 MG/1; MG/1; MG/1; MG/1
40 CAPSULE, EXTENDED RELEASE ORAL EVERY MORNING
Qty: 60 CAPSULE | Refills: 0 | Status: SHIPPED | OUTPATIENT
Start: 2021-09-07 | End: 2021-10-12

## 2021-09-07 NOTE — TELEPHONE ENCOUNTER
Pending Prescriptions:                       Disp   Refills    amphetamine-dextroamphetamine (ADDERALL X*60 cap*0            Sig: Take 2 capsules (40 mg) by mouth every morning    cetirizine (ZYRTEC) 10 MG tablet          90 tab*1            Sig: [CETIRIZINE (ZYRTEC) 10 MG TABLET] TAKE 1 TABLET           BY MOUTH EVERY DAY

## 2021-09-12 ENCOUNTER — HEALTH MAINTENANCE LETTER (OUTPATIENT)
Age: 51
End: 2021-09-12

## 2021-10-11 DIAGNOSIS — J30.1 SEASONAL ALLERGIC RHINITIS DUE TO POLLEN: ICD-10-CM

## 2021-10-11 DIAGNOSIS — F90.0 ATTENTION DEFICIT HYPERACTIVITY DISORDER (ADHD), PREDOMINANTLY INATTENTIVE TYPE: ICD-10-CM

## 2021-10-11 NOTE — TELEPHONE ENCOUNTER
Reason for Call:  Medication or medication refill:    Do you use a [facility]?  Name of the pharmacy and phone number for the current request:  [facility]ACY #[id number] - [facility], [place] - [address]    Name of the medication requested:   amphetamine-dextroamphetamine (ADDERALL XR) 20 MG 24 hr capsule  cetirizine (ZYRTEC) 10 MG tablet    Other request: Patient is aware Dr. Lanza is out today. Patient still have some pills left. Ok to wait for pcp tomorrow.    Can we leave a detailed message on this number? YES    Phone number patient can be reached at: Cell number on file:    Telephone Information:   Mobile [phone]       Best Time: any    Call taken on 10/11/2021 at 9:46 AM by Darryl Acevedo

## 2021-10-12 RX ORDER — DEXTROAMPHETAMINE SACCHARATE, AMPHETAMINE ASPARTATE MONOHYDRATE, DEXTROAMPHETAMINE SULFATE AND AMPHETAMINE SULFATE 5; 5; 5; 5 MG/1; MG/1; MG/1; MG/1
40 CAPSULE, EXTENDED RELEASE ORAL EVERY MORNING
Qty: 60 CAPSULE | Refills: 0 | Status: SHIPPED | OUTPATIENT
Start: 2021-10-12 | End: 2021-12-29

## 2021-10-12 RX ORDER — CETIRIZINE HYDROCHLORIDE 10 MG/1
TABLET ORAL
Qty: 90 TABLET | Refills: 1 | Status: SHIPPED | OUTPATIENT
Start: 2021-10-12 | End: 2022-08-19

## 2021-12-28 DIAGNOSIS — Z76.0 ENCOUNTER FOR MEDICATION REFILL: Primary | ICD-10-CM

## 2021-12-28 DIAGNOSIS — F90.0 ATTENTION DEFICIT HYPERACTIVITY DISORDER (ADHD), PREDOMINANTLY INATTENTIVE TYPE: ICD-10-CM

## 2021-12-28 NOTE — TELEPHONE ENCOUNTER
Reason for Call:  Medication or medication refill:    Do you use a [facility]?  [facility] [place]    Name of the medication requested: Adderall    Other request: Pt Leaving for [place] on Friday and gone for a week and a half. Pt would need refill before he leaves.    Can we leave a detailed message on this number? YES    Phone number patient can be reached at: Home number on file [phone] (home)    Best Time: Yes    Call taken on 12/28/2021 at 4:11 PM by Boston Egan

## 2021-12-29 RX ORDER — DEXTROAMPHETAMINE SACCHARATE, AMPHETAMINE ASPARTATE MONOHYDRATE, DEXTROAMPHETAMINE SULFATE AND AMPHETAMINE SULFATE 5; 5; 5; 5 MG/1; MG/1; MG/1; MG/1
40 CAPSULE, EXTENDED RELEASE ORAL EVERY MORNING
Qty: 60 CAPSULE | Refills: 0 | Status: SHIPPED | OUTPATIENT
Start: 2021-12-29 | End: 2022-02-17

## 2022-02-17 ENCOUNTER — MYC REFILL (OUTPATIENT)
Dept: FAMILY MEDICINE | Facility: CLINIC | Age: 52
End: 2022-02-17

## 2022-02-17 DIAGNOSIS — Z76.0 ENCOUNTER FOR MEDICATION REFILL: ICD-10-CM

## 2022-02-17 DIAGNOSIS — F90.0 ATTENTION DEFICIT HYPERACTIVITY DISORDER (ADHD), PREDOMINANTLY INATTENTIVE TYPE: ICD-10-CM

## 2022-02-18 RX ORDER — DEXTROAMPHETAMINE SACCHARATE, AMPHETAMINE ASPARTATE MONOHYDRATE, DEXTROAMPHETAMINE SULFATE AND AMPHETAMINE SULFATE 5; 5; 5; 5 MG/1; MG/1; MG/1; MG/1
40 CAPSULE, EXTENDED RELEASE ORAL EVERY MORNING
Qty: 60 CAPSULE | Refills: 0 | Status: SHIPPED | OUTPATIENT
Start: 2022-02-18 | End: 2022-03-30

## 2022-02-18 NOTE — TELEPHONE ENCOUNTER
Routing refill request to provider for review/approval because:  Drug not on the G refill protocol   amphetamine-dextroamphetamine (ADDERALL XR) 20 MG 24 hr capsule 60 capsule 0 12/29/2021  No   Sig - Route: Take 2 capsules (40 mg) by mouth every morning - Oral     LAST OV-6/10/21

## 2022-03-30 DIAGNOSIS — F90.0 ATTENTION DEFICIT HYPERACTIVITY DISORDER (ADHD), PREDOMINANTLY INATTENTIVE TYPE: ICD-10-CM

## 2022-03-30 DIAGNOSIS — Z76.0 ENCOUNTER FOR MEDICATION REFILL: ICD-10-CM

## 2022-03-30 RX ORDER — DEXTROAMPHETAMINE SACCHARATE, AMPHETAMINE ASPARTATE MONOHYDRATE, DEXTROAMPHETAMINE SULFATE AND AMPHETAMINE SULFATE 5; 5; 5; 5 MG/1; MG/1; MG/1; MG/1
40 CAPSULE, EXTENDED RELEASE ORAL EVERY MORNING
Qty: 60 CAPSULE | Refills: 0 | Status: SHIPPED | OUTPATIENT
Start: 2022-03-30 | End: 2022-06-28

## 2022-03-30 NOTE — TELEPHONE ENCOUNTER
Reason for Call:  Medication refill of controlled substance    Do you use a [facility]?  No    Name of the pharmacy and phone number for the current request:  [facility]/pharmacy #[id number] - [facility], [address]  [phone]    Name of the medication requested: amphetamine-dextroamphetamine (ADDERALL XR) 20 MG 24 hr capsule    Can we leave a detailed message on this number? Not Applicable    Phone number patient can be reached at: Cell number on file:    Telephone Information:   Mobile [phone]       Best Time: anytime    Call taken on 3/30/2022 at 8:54 AM by Raghav Harris

## 2022-06-19 ENCOUNTER — HEALTH MAINTENANCE LETTER (OUTPATIENT)
Age: 52
End: 2022-06-19

## 2022-06-28 ENCOUNTER — MYC REFILL (OUTPATIENT)
Dept: FAMILY MEDICINE | Facility: CLINIC | Age: 52
End: 2022-06-28

## 2022-06-28 DIAGNOSIS — Z76.0 ENCOUNTER FOR MEDICATION REFILL: ICD-10-CM

## 2022-06-28 DIAGNOSIS — F90.0 ATTENTION DEFICIT HYPERACTIVITY DISORDER (ADHD), PREDOMINANTLY INATTENTIVE TYPE: ICD-10-CM

## 2022-06-29 RX ORDER — DEXTROAMPHETAMINE SACCHARATE, AMPHETAMINE ASPARTATE MONOHYDRATE, DEXTROAMPHETAMINE SULFATE AND AMPHETAMINE SULFATE 5; 5; 5; 5 MG/1; MG/1; MG/1; MG/1
40 CAPSULE, EXTENDED RELEASE ORAL EVERY MORNING
Qty: 60 CAPSULE | Refills: 0 | Status: SHIPPED | OUTPATIENT
Start: 2022-06-29 | End: 2022-09-20

## 2022-07-15 ENCOUNTER — TELEPHONE (OUTPATIENT)
Dept: FAMILY MEDICINE | Facility: CLINIC | Age: 52
End: 2022-07-15

## 2022-07-15 NOTE — TELEPHONE ENCOUNTER
Patient needs to be seen needs to sign control substance agreement for any further refills  amphetamine-dextroamphetamine (ADDERALL XR) 20 MG 24 hr capsule 60 capsule      Patient was called and agree to schedule appointment with  on August 11, 2022.

## 2022-07-15 NOTE — TELEPHONE ENCOUNTER
Central Prior Authorization Team  Phone: [phone]    PA Initiation    Medication:   Insurance Company: [employer] ([facility]) - Phone [phone] Fax [phone]  [id number] [place], [place] - [address]  Filling Pharmacy Phone: [phone]  Filling Pharmacy Fax:    Start Date: 7/15/2022

## 2022-07-15 NOTE — TELEPHONE ENCOUNTER
PA needed. Please process and let us know if you have any questions.    Thank you    JAYY Briggs, RN  [facility]

## 2022-07-15 NOTE — TELEPHONE ENCOUNTER
Clinic Action Needed: Yes, prior authorization needed  FNA Triage Call  Presenting Problem:         Disp Refills Start End ESAU   amphetamine-dextroamphetamine (ADDERALL XR) 20 MG 24 hr capsule 60 capsule 0 2022  No   Sig - Route: Take 2 capsules (40 mg) by mouth every morning - Oral   Sent to pharmacy as: Amphetamine-Dextroamphet ER 20 MG Oral Capsule Extended Release 24 Hour (ADDERALL XR)   Class: E-Prescribe   Earliest Fill Date: 2022     Prior auth approval , dated 2021-2022     Pharmacy states this needs new prior auth. Pt has not picked up this prescription, pt still has 1 week supply left.    Rachell Brown RN/[place] Nurse Advisor

## 2022-07-19 NOTE — TELEPHONE ENCOUNTER
Central Prior Authorization Team  Phone: [phone]    Prior Authorization Approval    Authorization Effective Date: 7/15/2022  Authorization Expiration Date: 7/15/2023  Medication: ADDERALL  Approved Dose/Quantity: 20MG CAPS  Reference #:     Insurance Company: [employer] ([facility]) - Phone [phone] Fax [phone]  Expected CoPay:       CoPay Card Available:      Foundation Assistance Needed:    Which Pharmacy is filling the prescription (Not needed for infusion/clinic administered): [id number], [place] - [address]  Pharmacy Notified: Yes  Patient Notified: Yes

## 2022-08-19 DIAGNOSIS — J30.1 SEASONAL ALLERGIC RHINITIS DUE TO POLLEN: ICD-10-CM

## 2022-08-19 DIAGNOSIS — Z76.0 ENCOUNTER FOR MEDICATION REFILL: Primary | ICD-10-CM

## 2022-08-19 RX ORDER — CETIRIZINE HYDROCHLORIDE 10 MG/1
TABLET ORAL
Qty: 90 TABLET | Refills: 3 | Status: SHIPPED | OUTPATIENT
Start: 2022-08-19

## 2022-09-20 ENCOUNTER — OFFICE VISIT (OUTPATIENT)
Dept: FAMILY MEDICINE | Facility: CLINIC | Age: 52
End: 2022-09-20
Payer: COMMERCIAL

## 2022-09-20 VITALS
TEMPERATURE: 99 F | HEIGHT: 73 IN | WEIGHT: 251 LBS | DIASTOLIC BLOOD PRESSURE: 100 MMHG | RESPIRATION RATE: 16 BRPM | HEART RATE: 92 BPM | BODY MASS INDEX: 33.27 KG/M2 | SYSTOLIC BLOOD PRESSURE: 148 MMHG

## 2022-09-20 DIAGNOSIS — Z12.11 SCREEN FOR COLON CANCER: ICD-10-CM

## 2022-09-20 DIAGNOSIS — Z00.00 ROUTINE HISTORY AND PHYSICAL EXAMINATION OF ADULT: Primary | ICD-10-CM

## 2022-09-20 DIAGNOSIS — I10 ESSENTIAL HYPERTENSION: ICD-10-CM

## 2022-09-20 DIAGNOSIS — F90.0 ATTENTION DEFICIT HYPERACTIVITY DISORDER (ADHD), PREDOMINANTLY INATTENTIVE TYPE: ICD-10-CM

## 2022-09-20 DIAGNOSIS — Z11.4 SCREENING FOR HIV (HUMAN IMMUNODEFICIENCY VIRUS): ICD-10-CM

## 2022-09-20 DIAGNOSIS — Z76.0 ENCOUNTER FOR MEDICATION REFILL: ICD-10-CM

## 2022-09-20 DIAGNOSIS — Z11.59 NEED FOR HEPATITIS C SCREENING TEST: ICD-10-CM

## 2022-09-20 DIAGNOSIS — L98.9 SKIN LESION OF BACK: ICD-10-CM

## 2022-09-20 LAB
ALBUMIN SERPL BCG-MCNC: 4.6 G/DL (ref 3.5–5.2)
ALP SERPL-CCNC: 94 U/L (ref 40–129)
ALT SERPL W P-5'-P-CCNC: 55 U/L (ref 10–50)
ANION GAP SERPL CALCULATED.3IONS-SCNC: 14 MMOL/L (ref 7–15)
AST SERPL W P-5'-P-CCNC: 68 U/L (ref 10–50)
BILIRUB SERPL-MCNC: 0.3 MG/DL
BUN SERPL-MCNC: 17.4 MG/DL (ref 6–20)
CALCIUM SERPL-MCNC: 10 MG/DL (ref 8.6–10)
CHLORIDE SERPL-SCNC: 97 MMOL/L (ref 98–107)
CHOLEST SERPL-MCNC: 291 MG/DL
CREAT SERPL-MCNC: 0.96 MG/DL (ref 0.67–1.17)
DEPRECATED HCO3 PLAS-SCNC: 25 MMOL/L (ref 22–29)
GFR SERPL CREATININE-BSD FRML MDRD: >90 ML/MIN/1.73M2
GLUCOSE SERPL-MCNC: 146 MG/DL (ref 70–99)
HDLC SERPL-MCNC: 29 MG/DL
LDLC SERPL CALC-MCNC: ABNORMAL MG/DL
NONHDLC SERPL-MCNC: 262 MG/DL
POTASSIUM SERPL-SCNC: 3.8 MMOL/L (ref 3.4–5.3)
PROT SERPL-MCNC: 8.8 G/DL (ref 6.4–8.3)
SODIUM SERPL-SCNC: 136 MMOL/L (ref 136–145)
TRIGL SERPL-MCNC: 714 MG/DL

## 2022-09-20 PROCEDURE — 90471 IMMUNIZATION ADMIN: CPT | Performed by: FAMILY MEDICINE

## 2022-09-20 PROCEDURE — 36415 COLL VENOUS BLD VENIPUNCTURE: CPT | Performed by: FAMILY MEDICINE

## 2022-09-20 PROCEDURE — 86803 HEPATITIS C AB TEST: CPT | Performed by: FAMILY MEDICINE

## 2022-09-20 PROCEDURE — 90715 TDAP VACCINE 7 YRS/> IM: CPT | Performed by: FAMILY MEDICINE

## 2022-09-20 PROCEDURE — 91313 COVID-19,PF,MODERNA BIVALENT: CPT | Performed by: FAMILY MEDICINE

## 2022-09-20 PROCEDURE — 90472 IMMUNIZATION ADMIN EACH ADD: CPT | Performed by: FAMILY MEDICINE

## 2022-09-20 PROCEDURE — 87389 HIV-1 AG W/HIV-1&-2 AB AG IA: CPT | Performed by: FAMILY MEDICINE

## 2022-09-20 PROCEDURE — 99396 PREV VISIT EST AGE 40-64: CPT | Mod: 25 | Performed by: FAMILY MEDICINE

## 2022-09-20 PROCEDURE — 0134A COVID-19,PF,MODERNA BIVALENT: CPT | Performed by: FAMILY MEDICINE

## 2022-09-20 PROCEDURE — 80061 LIPID PANEL: CPT | Performed by: FAMILY MEDICINE

## 2022-09-20 PROCEDURE — 83721 ASSAY OF BLOOD LIPOPROTEIN: CPT | Mod: 59 | Performed by: FAMILY MEDICINE

## 2022-09-20 PROCEDURE — 90682 RIV4 VACC RECOMBINANT DNA IM: CPT | Performed by: FAMILY MEDICINE

## 2022-09-20 PROCEDURE — 80053 COMPREHEN METABOLIC PANEL: CPT | Performed by: FAMILY MEDICINE

## 2022-09-20 PROCEDURE — 99213 OFFICE O/P EST LOW 20 MIN: CPT | Mod: 25 | Performed by: FAMILY MEDICINE

## 2022-09-20 RX ORDER — HYDROCHLOROTHIAZIDE 25 MG/1
TABLET ORAL
Qty: 90 TABLET | Refills: 3 | Status: SHIPPED | OUTPATIENT
Start: 2022-09-20 | End: 2024-07-24

## 2022-09-20 RX ORDER — DEXTROAMPHETAMINE SACCHARATE, AMPHETAMINE ASPARTATE MONOHYDRATE, DEXTROAMPHETAMINE SULFATE AND AMPHETAMINE SULFATE 5; 5; 5; 5 MG/1; MG/1; MG/1; MG/1
40 CAPSULE, EXTENDED RELEASE ORAL EVERY MORNING
Qty: 60 CAPSULE | Refills: 0 | Status: SHIPPED | OUTPATIENT
Start: 2022-09-20 | End: 2022-11-18

## 2022-09-20 ASSESSMENT — ENCOUNTER SYMPTOMS
EYE PAIN: 0
MYALGIAS: 0
HEARTBURN: 0
ARTHRALGIAS: 0
DIARRHEA: 0
HEMATOCHEZIA: 0
PARESTHESIAS: 0
ABDOMINAL PAIN: 0
SORE THROAT: 0
DIZZINESS: 0
DYSURIA: 0
NERVOUS/ANXIOUS: 0
FREQUENCY: 1
CHILLS: 0
FEVER: 0
NAUSEA: 0
CONSTIPATION: 0
JOINT SWELLING: 0
HEMATURIA: 0
SHORTNESS OF BREATH: 0
COUGH: 0
PALPITATIONS: 0
WEAKNESS: 0
HEADACHES: 0

## 2022-09-20 ASSESSMENT — PATIENT HEALTH QUESTIONNAIRE - PHQ9
10. IF YOU CHECKED OFF ANY PROBLEMS, HOW DIFFICULT HAVE THESE PROBLEMS MADE IT FOR YOU TO DO YOUR WORK, TAKE CARE OF THINGS AT HOME, OR GET ALONG WITH OTHER PEOPLE: VERY DIFFICULT
SUM OF ALL RESPONSES TO PHQ QUESTIONS 1-9: 4
SUM OF ALL RESPONSES TO PHQ QUESTIONS 1-9: 4

## 2022-09-20 NOTE — LETTER
[facility]  09/20/22  Patient: Teddy Ayala  YOB: 1970  Medical Record Number: [id number]                                                                                  Non-Opioid Controlled Substance Agreement    This is an agreement between you and your provider regarding safe and appropriate use of controlled substances prescribed by your care team. Controlled substances are?medicines that can cause physical and mental dependence (abuse).     There are strict laws about having and using these medicines. We here at [facility] are  committed to working with you in your efforts to get better. To support you in this work, we'll help you schedule regular office appointments for medicine refills. If we must cancel or change your appointment for any reason, we'll make sure you have enough medicine to last until your next appointment.     As a Provider, I will:     Listen carefully to your concerns while treating you with respect.     Recommend a treatment plan that I believe is in your best interest and may involve therapies other than medicine.      Talk with you often about the possible benefits and the risk of harm of any medicine that we prescribe for you.    Assess the safety of this medicine and check how well it works.      Provide a plan on how to taper (discontinue or go off) using this medicine if the decision is made to stop its use.      ::  As a Patient, I understand controlled substances:       Are prescribed by my care provider to help me function or work and manage my condition(s).?    Are strong medicines and can cause serious side effects.       Need to be taken exactly as prescribed.?Combining controlled substances with certain medicines or chemicals (such as illegal drugs, alcohol, sedatives, sleeping pills, and benzodiazepines) can be dangerous or even fatal.? If I stop taking my medicines suddenly, I may have severe withdrawal symptoms.     The risks,  benefits, and side effects of these medicine(s) were explained to me. I agree that:    1. I will take part in other treatments as advised by my care team. This may be psychiatry or counseling, physical therapy, behavioral therapy, group treatment or a referral to specialist.    2. I will keep all my appointments and understand this is part of the monitoring of controlled substances.?My care team may require an office visit for EVERY controlled substance refill. If I miss appointments or don t follow instructions, my care team may stop my medicine    3. I will take my medicines as prescribed. I will not change the dose or schedule unless my care team tells me to. There will be no refills if I run out early.      4. I may be asked to come to the clinic and complete a urine drug test or complete a pill count. If I don t give a urine sample or participate in a pill count, the care team may stop my medicine.    5. I will only receive controlled substance prescriptions from this clinic. If I am treated by another provider, I will tell them that I am taking controlled substances and that I have a treatment agreement with this provider. I will inform my [facility] care team within one business day if I am given a prescription for any controlled substance by another healthcare provider. My [facility] care team can contact other providers and pharmacists about my use of any medicines.    6. It is up to me to make sure that I don't run out of my medicines on weekends or holidays.?If my care team is willing to refill my prescription without a visit, I must request refills only during office hours. Refills may take up to 3 business days to process. I will use one pharmacy to fill all my controlled substance prescriptions. I will notify the clinic about any changes to my insurance or medicine availability.    7. I am responsible for my prescriptions. If the medicine/prescription is lost, stolen or destroyed, it will  not be replaced.?I also agree not to share controlled substance medicines with anyone.     8. I am aware I should not use any illegal or recreational drugs. I agree not to drink alcohol unless my care team says I can.     9. If I enroll in the [place] Medical Cannabis program, I will tell my care team before my next refill.    10. I will tell my care team right away if I become pregnant, have a new medical problem treated outside of my regular clinic, or have a change in my medicines.     11. I understand that this medicine can affect my thinking, judgment and reaction time.? Alcohol and drugs affect the brain and body, which can affect the safety of my driving. Being under the influence of alcohol or drugs can affect my decision-making, behaviors, personal safety and the safety of others. Driving while impaired (DWI) can occur if a person is driving, operating or in physical control of a car, motorcycle, boat, snowmobile, ATV, motorbike, off-road vehicle or any other motor vehicle ([place] Statute 169A.20). I understand the risk if I choose to drive or operate any vehicle or machinery.    I understand that if I do not follow any of the conditions above, my prescriptions or treatment may be stopped or changed.   I agree that my provider, clinic care team and pharmacy may work with any city, state or federal law enforcement agency that investigates the misuse, sale or other diversion of my controlled medicine. I will allow my provider to discuss my care with, or share a copy of, this agreement with any other treating provider, pharmacy or emergency room where I receive care.     I have read this agreement and have asked questions about anything I did not understand.    ________________________________________________________  Patient Signature - Teddy Ayala     ___________________                   Date     ________________________________________________________  Provider Signature - Issa Lanza MD        ___________________                   Date     ________________________________________________________  Witness Signature (required if provider not present while patient signing)          ___________________                   Date

## 2022-09-20 NOTE — PROGRESS NOTES
SUBJECTIVE:   CC: Teddy is an 52 year old who presents for preventative health visit.   Patient is not fasting here is here for his annual physical he reports that he has not been taking his blood pressure medications regularly as he like.  He states that he sometimes he forgets to take the medication he also has a medical history significant for ADHD and reports that sometimes he forgets to take that medication on weekends he thinks that work life stress is increasing he particularly does not like his job and is planning to move to another section.  He reports that for the most part the ADHD is well controlled with the medication.  He still thinks it affects his social life and the fact that his house is cluttered.  He is contemplating getting someone to clean his house regularly.  He is actually thinking of so dating socially.  He reports that although he has not started exercise he has been sleeping relatively well some days however he sleeps poorly and has been taking melatonin for that denies any heavy alcohol use.  States he is not depressed he gave up using duloxetine because it made him feel worse several months ago    He also notes that when he does not take his allergy medication he begins to snore he has been taking it regularly so that has not been a problem  He has 1 skin lesion on his back that is been bothering him and has not been growing in size he would like me to remove it    Patient has been advised of split billing requirements and indicates understanding: Yes  Healthy Habits:     Getting at least 3 servings of Calcium per day:  NO    Bi-annual eye exam:  Yes    Dental care twice a year:  Yes    Sleep apnea or symptoms of sleep apnea:  Excessive snoring    Diet:  Regular (no restrictions)    Frequency of exercise:  1 day/week    Duration of exercise:  Less than 15 minutes    Taking medications regularly:  Yes    Medication side effects:  None    PHQ-2 Total Score: 0    Additional concerns today:   Yes              Today's PHQ-2 Score:   PHQ-2 ( 1999 Pfizer) 9/20/2022   Q1: Little interest or pleasure in doing things 0   Q2: Feeling down, depressed or hopeless 0   PHQ-2 Score 0   Q1: Little interest or pleasure in doing things Not at all   Q2: Feeling down, depressed or hopeless Not at all   PHQ-2 Score 0       Abuse: Current or Past(Physical, Sexual or Emotional)- No  Do you feel safe in your environment? Yes        Social History     Tobacco Use     Smoking status: Never Smoker     Smokeless tobacco: Never Used     Tobacco comment: No passive exposure   Substance Use Topics     Alcohol use: Yes     Comment: Alcoholic Drinks/day: occasional         Alcohol Use 9/20/2022   Prescreen: >3 drinks/day or >7 drinks/week? No       Last PSA: No results found for: PSA    Reviewed orders with patient. Reviewed health maintenance and updated orders accordingly - Yes  Lab work is in process  Labs reviewed in EPIC    Reviewed and updated as needed this visit by clinical staff   Tobacco  Allergies  Meds                Reviewed and updated as needed this visit by Provider                       Review of Systems   Constitutional: Negative for chills and fever.   HENT: Negative for congestion, ear pain, hearing loss and sore throat.    Eyes: Negative for pain and visual disturbance.   Respiratory: Negative for cough and shortness of breath.    Cardiovascular: Negative for chest pain, palpitations and peripheral edema.   Gastrointestinal: Negative for abdominal pain, constipation, diarrhea, heartburn, hematochezia and nausea.   Genitourinary: Positive for frequency. Negative for dysuria, genital sores, hematuria, impotence, penile discharge and urgency.   Musculoskeletal: Negative for arthralgias, joint swelling and myalgias.   Skin: Negative for rash.   Neurological: Negative for dizziness, weakness, headaches and paresthesias.   Psychiatric/Behavioral: Negative for mood changes. The patient is not nervous/anxious.   "    CONSTITUTIONAL: NEGATIVE for fever, chills, change in weight  INTEGUMENTARY/SKIN: NEGATIVE for worrisome rashes, moles or lesions  EYES: NEGATIVE for vision changes or irritation  ENT: NEGATIVE for ear, mouth and throat problems  RESP: NEGATIVE for significant cough or SOB  CV: NEGATIVE for chest pain, palpitations or peripheral edema  GI: NEGATIVE for nausea, abdominal pain, heartburn, or change in bowel habits   male: negative for dysuria, hematuria, decreased urinary stream, erectile dysfunction, urethral discharge  MUSCULOSKELETAL: NEGATIVE for significant arthralgias or myalgia  NEURO: NEGATIVE for weakness, dizziness or paresthesias  PSYCHIATRIC: NEGATIVE for changes in mood or affect    OBJECTIVE:   BP (!) 148/100   Pulse 92   Temp 99  F (37.2  C) (Oral)   Resp 16   Ht 1.854 m (6' 1\")   Wt 113.9 kg (251 lb)   BMI 33.12 kg/m      Physical Exam  GENERAL: healthy, alert and no distress  EYES: Eyes grossly normal to inspection, PERRL and conjunctivae and sclerae normal  HENT: ear canals and TM's normal, nose and mouth without ulcers or lesions  NECK: no adenopathy, no asymmetry, masses, or scars and thyroid normal to palpation  RESP: lungs clear to auscultation - no rales, rhonchi or wheezes  CV: regular rate and rhythm, normal S1 S2, no S3 or S4, no murmur, click or rub, no peripheral edema and peripheral pulses strong  ABDOMEN: soft, nontender, no hepatosplenomegaly, no masses and bowel sounds normal  MS: no gross musculoskeletal defects noted, no edema  SKIN: Solitary pedunculated skin lesion with a narrow stalk present on over the skin of the lumbar spine at the L3 level  NEURO: Normal strength and tone, mentation intact and speech normal  PSYCH: mentation appears normal, affect normal/bright    Diagnostic Test Results:  Labs reviewed in Epic    ASSESSMENT/PLAN:       ICD-10-CM    1. Routine history and physical examination of adult  Z00.00 Lipid panel reflex to direct LDL Non-fasting     " "Comprehensive metabolic panel (BMP + Alb, Alk Phos, ALT, AST, Total. Bili, TP)     Lipid panel reflex to direct LDL Non-fasting     Comprehensive metabolic panel (BMP + Alb, Alk Phos, ALT, AST, Total. Bili, TP)   2. Screen for colon cancer  Z12.11    3. Screening for HIV (human immunodeficiency virus)  Z11.4 HIV Antigen Antibody Combo     HIV Antigen Antibody Combo   4. Need for hepatitis C screening test  Z11.59 Hepatitis C Screen Reflex to HCV RNA Quant and Genotype     Hepatitis C Screen Reflex to HCV RNA Quant and Genotype   5. Attention deficit hyperactivity disorder (ADHD), predominantly inattentive type  F90.0 amphetamine-dextroamphetamine (ADDERALL XR) 20 MG 24 hr capsule   6. Encounter for medication refill  Z76.0 amphetamine-dextroamphetamine (ADDERALL XR) 20 MG 24 hr capsule   7. Essential hypertension  I10 hydrochlorothiazide (HYDRODIURIL) 25 MG tablet   8. Skin lesion of back  L98.9        Patient has been advised of split billing requirements and indicates understanding: Yes    COUNSELING:   Reviewed preventive health counseling, as reflected in patient instructions       Regular exercise       Healthy diet/nutrition    Estimated body mass index is 33.12 kg/m  as calculated from the following:    Height as of this encounter: 1.854 m (6' 1\").    Weight as of this encounter: 113.9 kg (251 lb).         He reports that he has never smoked. He has never used smokeless tobacco.      Counseling Resources:  ATP IV Guidelines  Pooled Cohorts Equation Calculator  FRAX Risk Assessment  ICSI Preventive Guidelines  Dietary Guidelines for Americans, 2010  USDA's MyPlate  ASA Prophylaxis  Lung CA Screening    Issa Lanza MD  [facility]  Answers for HPI/ROS submitted by the patient on 9/20/2022  If you checked off any problems, how difficult have these problems made it for you to do your work, take care of things at home, or get along with other people?: Very difficult  PHQ9 TOTAL SCORE: 4      "

## 2022-09-20 NOTE — LETTER
September 21, 2022      Teddynatalie Ayala  [address]  [place] [address]        Dear ,    We are writing to inform you of your test results.    Your kidney function and liver function tests are slightly elevated, abnormal.     Additionally, your LDL cholesterol (bad cholesterol), which puts you at risk for stroke and heart attack is too high.     Your triglycerides (a part of the cholesterol profile) is also high.     Your glucose is high.     Please schedule a FASTING lab-only appointment at [facility]. I would like to recheck these labs to make sure they are correct. Please fast (no food or drink other than water for 12 hours prior) before coming to your lab once it is scheduled. Not fasting properly will cause falsely elevated tests.     Resulted Orders   Lipid panel reflex to direct LDL Non-fasting   Result Value Ref Range    Cholesterol 291 (H) <200 mg/dL    Triglycerides 714 (H) <150 mg/dL    Direct Measure HDL 29 (L) >=40 mg/dL    LDL Cholesterol Calculated        Comment:      Cannot estimate LDL when triglyceride exceeds 400 mg/dL    Non HDL Cholesterol 262 (H) <130 mg/dL    Narrative    Cholesterol  Desirable:  <200 mg/dL    Triglycerides  Normal:  Less than 150 mg/dL  Borderline High:  150-199 mg/dL  High:  200-499 mg/dL  Very High:  Greater than or equal to 500 mg/dL    Direct Measure HDL  Female:  Greater than or equal to 50 mg/dL   Male:  Greater than or equal to 40 mg/dL    LDL Cholesterol  Desirable:  <100mg/dL  Above Desirable:  100-129 mg/dL   Borderline High:  130-159 mg/dL   High:  160-189 mg/dL   Very High:  >= 190 mg/dL    Non HDL Cholesterol  Desirable:  130 mg/dL  Above Desirable:  130-159 mg/dL  Borderline High:  160-189 mg/dL  High:  190-219 mg/dL  Very High:  Greater than or equal to 220 mg/dL   Comprehensive metabolic panel (BMP + Alb, Alk Phos, ALT, AST, Total. Bili, TP)   Result Value Ref Range    Sodium 136 136 - 145 mmol/L    Potassium 3.8 3.4 - 5.3 mmol/L    Chloride 97  (L) 98 - 107 mmol/L    Carbon Dioxide (CO2) 25 22 - 29 mmol/L    Anion Gap 14 7 - 15 mmol/L    Urea Nitrogen 17.4 6.0 - 20.0 mg/dL    Creatinine 0.96 0.67 - 1.17 mg/dL    Calcium 10.0 8.6 - 10.0 mg/dL    Glucose 146 (H) 70 - 99 mg/dL    Alkaline Phosphatase 94 40 - 129 U/L    AST 68 (H) 10 - 50 U/L    ALT 55 (H) 10 - 50 U/L    Protein Total 8.8 (H) 6.4 - 8.3 g/dL    Albumin 4.6 3.5 - 5.2 g/dL    Bilirubin Total 0.3 <=1.2 mg/dL    GFR Estimate >90 >60 mL/min/1.73m2      Comment:      Effective December 21, 2021 eGFRcr in adults is calculated using the 2021 CKD-EPI creatinine equation which includes age and gender (Osvaldo et al., NEJ, DOI: 10.1056/UJQWfb2042878)   LDL cholesterol direct   Result Value Ref Range    LDL Cholesterol Direct 161 (H) <100 mg/dL      Comment:      Age 2-19 years:  Desirable: 0-110 mg/dL   Borderline high: 110-129 mg/dL   High: >= 130 mg/dL    Age 20 years and older:  Desirable: <100mg/dL  Above desirable: 100-129 mg/dL   Borderline high: 130-159 mg/dL   High: 160-189 mg/dL   Very high: >= 190 mg/dL       If you have any questions or concerns, please call the clinic at the number listed above.       Sincerely,      Issa Lanza MD

## 2022-09-21 LAB
HCV AB SERPL QL IA: NONREACTIVE
HIV 1+2 AB+HIV1 P24 AG SERPL QL IA: NONREACTIVE
LDLC SERPL DIRECT ASSAY-MCNC: 161 MG/DL

## 2022-09-21 NOTE — RESULT ENCOUNTER NOTE
Please inform patient that kidney function test was normal liver function tests are slightly elevated and his LDL or poor cholesterol is high.  Triglycerides are very high as is his glucose I would like him to return for another test he does not need to see me and he does not need to be fasting would be would he like to come in?

## 2022-11-29 DIAGNOSIS — Z76.0 ENCOUNTER FOR MEDICATION REFILL: ICD-10-CM

## 2022-11-29 DIAGNOSIS — F90.0 ATTENTION DEFICIT HYPERACTIVITY DISORDER (ADHD), PREDOMINANTLY INATTENTIVE TYPE: ICD-10-CM

## 2022-11-29 RX ORDER — DEXTROAMPHETAMINE SACCHARATE, AMPHETAMINE ASPARTATE MONOHYDRATE, DEXTROAMPHETAMINE SULFATE AND AMPHETAMINE SULFATE 5; 5; 5; 5 MG/1; MG/1; MG/1; MG/1
40 CAPSULE, EXTENDED RELEASE ORAL EVERY MORNING
Qty: 60 CAPSULE | Refills: 0 | Status: SHIPPED | OUTPATIENT
Start: 2022-11-29 | End: 2023-01-30

## 2022-12-05 ENCOUNTER — OFFICE VISIT (OUTPATIENT)
Dept: FAMILY MEDICINE | Facility: CLINIC | Age: 52
End: 2022-12-05
Payer: COMMERCIAL

## 2022-12-05 ENCOUNTER — HOSPITAL ENCOUNTER (OUTPATIENT)
Dept: GENERAL RADIOLOGY | Facility: HOSPITAL | Age: 52
Discharge: HOME | End: 2022-12-05
Attending: PHYSICIAN ASSISTANT | Admitting: PHYSICIAN ASSISTANT
Payer: COMMERCIAL

## 2022-12-05 VITALS
TEMPERATURE: 100.2 F | OXYGEN SATURATION: 94 % | BODY MASS INDEX: 33.78 KG/M2 | DIASTOLIC BLOOD PRESSURE: 104 MMHG | WEIGHT: 256 LBS | RESPIRATION RATE: 26 BRPM | SYSTOLIC BLOOD PRESSURE: 145 MMHG | HEART RATE: 113 BPM

## 2022-12-05 DIAGNOSIS — R05.1 ACUTE COUGH: ICD-10-CM

## 2022-12-05 DIAGNOSIS — R06.02 SOB (SHORTNESS OF BREATH): ICD-10-CM

## 2022-12-05 DIAGNOSIS — R07.0 THROAT PAIN: ICD-10-CM

## 2022-12-05 DIAGNOSIS — R68.83 CHILLS: Primary | ICD-10-CM

## 2022-12-05 DIAGNOSIS — J06.9 UPPER RESPIRATORY TRACT INFECTION, UNSPECIFIED TYPE: ICD-10-CM

## 2022-12-05 LAB
DEPRECATED S PYO AG THROAT QL EIA: NEGATIVE
FLUAV AG SPEC QL IA: NEGATIVE
FLUBV AG SPEC QL IA: NEGATIVE
GROUP A STREP BY PCR: NOT DETECTED
SARS-COV-2 RNA RESP QL NAA+PROBE: NEGATIVE

## 2022-12-05 PROCEDURE — 87651 STREP A DNA AMP PROBE: CPT | Performed by: PHYSICIAN ASSISTANT

## 2022-12-05 PROCEDURE — 99204 OFFICE O/P NEW MOD 45 MIN: CPT | Mod: CS | Performed by: PHYSICIAN ASSISTANT

## 2022-12-05 PROCEDURE — U0005 INFEC AGEN DETEC AMPLI PROBE: HCPCS | Performed by: PHYSICIAN ASSISTANT

## 2022-12-05 PROCEDURE — U0003 INFECTIOUS AGENT DETECTION BY NUCLEIC ACID (DNA OR RNA); SEVERE ACUTE RESPIRATORY SYNDROME CORONAVIRUS 2 (SARS-COV-2) (CORONAVIRUS DISEASE [COVID-19]), AMPLIFIED PROBE TECHNIQUE, MAKING USE OF HIGH THROUGHPUT TECHNOLOGIES AS DESCRIBED BY CMS-2020-01-R: HCPCS | Performed by: PHYSICIAN ASSISTANT

## 2022-12-05 PROCEDURE — 71046 X-RAY EXAM CHEST 2 VIEWS: CPT

## 2022-12-05 PROCEDURE — 87804 INFLUENZA ASSAY W/OPTIC: CPT | Performed by: PHYSICIAN ASSISTANT

## 2022-12-05 RX ORDER — BENZONATATE 200 MG/1
200 CAPSULE ORAL 3 TIMES DAILY PRN
Qty: 30 CAPSULE | Refills: 1 | Status: SHIPPED | OUTPATIENT
Start: 2022-12-05

## 2022-12-05 NOTE — PROGRESS NOTES
Assessment & Plan     Pt seen for uri sx, cough, fever and ST.  His influenza test is negative, CXR reveals no PNA or effusions, and Covid 19 test is pending.  Likely represents viral illness.   Push fluids, rest and ibuprofen or tylenol for comfort.    RTC for persistent or worsening sx.   Tessalon for cough.  Note for work provided.     Throat pain    - Streptococcus A Rapid Screen w/Reflex to PCR - Clinic Collect  - Group A Streptococcus PCR Throat Swab      Upper respiratory tract infection, unspecified type    - Symptomatic; Yes; 12/2/2022 COVID-19 Virus (Coronavirus) by PCR Nose  - benzonatate (TESSALON) 200 MG capsule  Dispense: 30 capsule; Refill: 1    Acute cough    - XR Chest 2 Views    SOB (shortness of breath)  - XR Chest 2 Views     Linda Beard PA-C  [facility] LORENA Espinal is a 52 year old male who presents to clinic today for the following health issues:  Chief Complaint   Patient presents with     Pharyngitis     X Friday. Congestion, woke up with SOB, some chest tightness. Pt states sleeping 12 hours per day. Throat pain. Chills last night but passed.     Letter for School/Work     Work note for today's visit     HPI    ST, upset stomach, fatigue x 3 days.    Cough, short of breath at times.    Feverish, shaking chills, sweats.    No nausea, vomiting.    Covid: 19 test not done.   Some chest discomfort with coughing      Patient Active Problem List   Diagnosis     Major depression, recurrent (H)     ADHD (attention deficit hyperactivity disorder)     Hypertension     Insomnia, unspecified type     Current Outpatient Medications   Medication     amphetamine-dextroamphetamine (ADDERALL XR) 20 MG 24 hr capsule     benzonatate (TESSALON) 200 MG capsule     cetirizine (ZYRTEC) 10 MG tablet     fluticasone propionate (FLONASE) 50 mcg/actuation nasal spray     hydrochlorothiazide (HYDRODIURIL) 25 MG tablet     No current facility-administered medications for this  visit.       Review of Systems        Objective    BP (!) 145/104 (BP Location: Right arm, Patient Position: Sitting, Cuff Size: Adult Large)   Pulse 113   Temp 100.2  F (37.9  C) (Oral)   Resp 26   Wt 116.1 kg (256 lb)   SpO2 94%   BMI 33.78 kg/m    Physical Exam     Pt is in no acute distress and appears well  Ears patent B:  TM s intact, non-injected. All land marks easily visibile    Nasal mucosa is non-edematous, no discharge.    Pharynx: non erythematous, tonsils non hypertrophied, No exudate   Neck supple: no adenopathy  Lungs: CTA  Heart: RRR, no murmur, no thrills or heaves   Ext: no edema  Skin: no rashes    Results for orders placed or performed during the hospital encounter of 12/05/22   XR Chest 2 Views     Status: None    Narrative    EXAM: XR CHEST 2 VIEWS  LOCATION: [facility]  DATE/TIME: 12/5/2022 11:34 AM    INDICATION:  Acute cough, SOB (shortness of breath)  COMPARISON: None.      Impression    IMPRESSION: Negative chest.  The lungs are clear and there are no pleural effusions. Normal heart size.   Results for orders placed or performed in visit on 12/05/22   Symptomatic; Yes; 12/2/2022 COVID-19 Virus (Coronavirus) by PCR Nose     Status: Normal    Specimen: Nose; Swab   Result Value Ref Range    SARS CoV2 PCR Negative Negative    Narrative    Testing was performed using the Aptima SARS-CoV-2 Assay on the  [employer] Instrument System. Additional information about this  Emergency Use Authorization (EUA) assay can be found via the Lab  Guide. This test should be ordered for the detection of SARS-CoV-2 in  individuals who meet SARS-CoV-2 clinical and/or epidemiological  criteria. Test performance is unknown in asymptomatic patients. This  test is for in vitro diagnostic use under the FDA EUA for  laboratories certified under CLIA to perform high complexity testing.  This test has not been FDA cleared or approved. A negative result  does not rule out the presence of PCR  inhibitors in the specimen or  target RNA in concentration below the limit of detection for the  assay. The possibility of a false negative should be considered if  the patient's recent exposure or clinical presentation suggests  COVID-19. This test was validated by the [facility]. This laboratory is certified under  the Clinical Laboratory Improvement Amendments of 1988 (CLIA-88) as  qualified to perform high complexity laboratory testing.   Streptococcus A Rapid Screen w/Reflex to PCR - Clinic Collect     Status: Normal    Specimen: Throat; Swab   Result Value Ref Range    Group A Strep antigen Negative Negative   Group A Streptococcus PCR Throat Swab     Status: Normal    Specimen: Throat; Swab   Result Value Ref Range    Group A strep by PCR Not Detected Not Detected    Narrative    The Xpert Xpress Strep A test, performed on the [employer] Systems, is a rapid, qualitative in vitro diagnostic test for the detection of Streptococcus pyogenes (Group A ß-hemolytic Streptococcus, Strep A) in throat swab specimens from patients with signs and symptoms of pharyngitis. The Xpert Xpress Strep A test can be used as an aid in the diagnosis of Group A Streptococcal pharyngitis. The assay is not intended to monitor treatment for Group A Streptococcus infections. The Xpert Xpress Strep A test utilizes an automated real-time polymerase chain reaction (PCR) to detect Streptococcus pyogenes DNA.   Influenza A & B Antigen - Clinic Collect     Status: Normal    Specimen: Nose; Swab   Result Value Ref Range    Influenza A antigen Negative Negative    Influenza B antigen Negative Negative    Narrative    Test results must be correlated with clinical data. If necessary, results should be confirmed by a molecular assay or viral culture.

## 2022-12-05 NOTE — LETTER
[address] [address]  Phone: [phone]  Fax: [phone]    December 5, 2022        Teddy S [address]   [address]  [place] [address]          To whom it may concern:    RE: Teddy Ayala    Teddy was seen due to illness and unable to attend work until fever free for 24 hours.      Please contact me for questions or concerns.      Sincerely,        Linda Beard PA-C

## 2023-01-30 DIAGNOSIS — Z76.0 ENCOUNTER FOR MEDICATION REFILL: Primary | ICD-10-CM

## 2023-01-30 DIAGNOSIS — F90.0 ATTENTION DEFICIT HYPERACTIVITY DISORDER (ADHD), PREDOMINANTLY INATTENTIVE TYPE: ICD-10-CM

## 2023-01-30 RX ORDER — DEXTROAMPHETAMINE SACCHARATE, AMPHETAMINE ASPARTATE MONOHYDRATE, DEXTROAMPHETAMINE SULFATE AND AMPHETAMINE SULFATE 5; 5; 5; 5 MG/1; MG/1; MG/1; MG/1
40 CAPSULE, EXTENDED RELEASE ORAL EVERY MORNING
Qty: 60 CAPSULE | Refills: 0 | Status: SHIPPED | OUTPATIENT
Start: 2023-01-30

## 2023-01-30 NOTE — TELEPHONE ENCOUNTER
Medication Question or Refill    Contacts       Type Contact Phone/Fax    01/30/2023 08:46 AM CST Phone (Incoming) Samuel Ayalan S (Self) [phone] (H)          What medication are you calling about (include dose and sig)?: amphetamine-dextroamphetamine (ADDERALL XR) 20 MG 24 hr capsule    Controlled Substance Agreement on file:   CSA -- Patient Level:     [Media Unavailable] Controlled Substance Agreement - Opioid - Scan on 8/17/2017: ADDERALL       Who prescribed the medication?: PCP    Do you need a refill? Yes: Needs a rush on Rx, insurance runs out tomorrow. If possible, please refill today.    When did you use the medication last?     Patient offered an appointment? No    Do you have any questions or concerns?  Needs refill ASAP since insurance runs out on 01/31/2023.    Preferred Pharmacy:     [facility]/pharmacy #[id number] - [place], [place] - [address]  [address] [place] [address]  Phone: [phone] Fax: [phone]      Could we send this information to you in [facility] or would you prefer to receive a phone call?:   Patient would prefer a phone call   Okay to leave a detailed message?: Yes at Home number on file [phone] (home)

## 2023-11-18 ENCOUNTER — HEALTH MAINTENANCE LETTER (OUTPATIENT)
Age: 53
End: 2023-11-18

## 2024-01-30 ENCOUNTER — TELEPHONE (OUTPATIENT)
Dept: FAMILY MEDICINE | Facility: CLINIC | Age: 54
End: 2024-01-30
Payer: COMMERCIAL

## 2024-01-30 NOTE — TELEPHONE ENCOUNTER
Reason for Call:  Appointment Request    Patient requesting this type of appt:  Preventive     Requested provider: Issa Lanza    Reason patient unable to be scheduled: Not within requested timeframe    When does patient want to be seen/preferred time:  before last day    Comments:     Could we send this information to you in [facility] or would you prefer to receive a phone call?:   Patient would prefer a phone call   Okay to leave a detailed message?: Yes at Home number on file [phone] (home)    Call taken on 1/30/2024 at 12:54 PM by Kristin SPENCE

## 2024-01-31 NOTE — TELEPHONE ENCOUNTER
Spoke to patient and scheduled.   Notified AK is leaving clinic mid-March and advised to schedule est care visit.     With Family Practice (Issa Lanza MD)  02/14/2024 at 10:00 AM 9:40 AM

## 2024-02-14 ENCOUNTER — OFFICE VISIT (OUTPATIENT)
Dept: FAMILY MEDICINE | Facility: CLINIC | Age: 54
End: 2024-02-14
Payer: COMMERCIAL

## 2024-02-14 ENCOUNTER — ORDERS ONLY (AUTO-RELEASED) (OUTPATIENT)
Dept: FAMILY MEDICINE | Facility: CLINIC | Age: 54
End: 2024-02-14

## 2024-02-14 VITALS
HEIGHT: 73 IN | DIASTOLIC BLOOD PRESSURE: 86 MMHG | TEMPERATURE: 97.5 F | RESPIRATION RATE: 20 BRPM | SYSTOLIC BLOOD PRESSURE: 138 MMHG | BODY MASS INDEX: 34.12 KG/M2 | HEART RATE: 87 BPM | WEIGHT: 257.44 LBS | OXYGEN SATURATION: 94 %

## 2024-02-14 DIAGNOSIS — Z12.11 SCREEN FOR COLON CANCER: Primary | ICD-10-CM

## 2024-02-14 DIAGNOSIS — J30.1 SEASONAL ALLERGIC RHINITIS DUE TO POLLEN: ICD-10-CM

## 2024-02-14 DIAGNOSIS — Z12.11 SCREEN FOR COLON CANCER: ICD-10-CM

## 2024-02-14 DIAGNOSIS — Z00.00 ROUTINE HISTORY AND PHYSICAL EXAMINATION OF ADULT: ICD-10-CM

## 2024-02-14 DIAGNOSIS — I10 PRIMARY HYPERTENSION: ICD-10-CM

## 2024-02-14 DIAGNOSIS — M79.2 NEURALGIA OF LEFT FOOT: ICD-10-CM

## 2024-02-14 DIAGNOSIS — G47.30 SLEEP APNEA, UNSPECIFIED TYPE: ICD-10-CM

## 2024-02-14 LAB
ALBUMIN SERPL BCG-MCNC: 4.2 G/DL (ref 3.5–5.2)
ALP SERPL-CCNC: 93 U/L (ref 40–150)
ALT SERPL W P-5'-P-CCNC: 50 U/L (ref 0–70)
ANION GAP SERPL CALCULATED.3IONS-SCNC: 11 MMOL/L (ref 7–15)
AST SERPL W P-5'-P-CCNC: 49 U/L (ref 0–45)
BILIRUB SERPL-MCNC: 0.6 MG/DL
BUN SERPL-MCNC: 16 MG/DL (ref 6–20)
CALCIUM SERPL-MCNC: 9.6 MG/DL (ref 8.6–10)
CHLORIDE SERPL-SCNC: 101 MMOL/L (ref 98–107)
CHOLEST SERPL-MCNC: 201 MG/DL
CREAT SERPL-MCNC: 1.04 MG/DL (ref 0.67–1.17)
DEPRECATED HCO3 PLAS-SCNC: 27 MMOL/L (ref 22–29)
EGFRCR SERPLBLD CKD-EPI 2021: 86 ML/MIN/1.73M2
FASTING STATUS PATIENT QL REPORTED: YES
GLUCOSE SERPL-MCNC: 129 MG/DL (ref 70–99)
HBA1C MFR BLD: 6.3 % (ref 0–5.6)
HDLC SERPL-MCNC: 27 MG/DL
LDLC SERPL CALC-MCNC: 144 MG/DL
NONHDLC SERPL-MCNC: 174 MG/DL
POTASSIUM SERPL-SCNC: 4.1 MMOL/L (ref 3.4–5.3)
PROT SERPL-MCNC: 8 G/DL (ref 6.4–8.3)
SODIUM SERPL-SCNC: 139 MMOL/L (ref 135–145)
TRIGL SERPL-MCNC: 149 MG/DL

## 2024-02-14 PROCEDURE — 80053 COMPREHEN METABOLIC PANEL: CPT | Performed by: FAMILY MEDICINE

## 2024-02-14 PROCEDURE — 80061 LIPID PANEL: CPT | Performed by: FAMILY MEDICINE

## 2024-02-14 PROCEDURE — 90471 IMMUNIZATION ADMIN: CPT | Performed by: FAMILY MEDICINE

## 2024-02-14 PROCEDURE — 90480 ADMN SARSCOV2 VAC 1/ONLY CMP: CPT | Performed by: FAMILY MEDICINE

## 2024-02-14 PROCEDURE — 36415 COLL VENOUS BLD VENIPUNCTURE: CPT | Performed by: FAMILY MEDICINE

## 2024-02-14 PROCEDURE — 83036 HEMOGLOBIN GLYCOSYLATED A1C: CPT | Performed by: FAMILY MEDICINE

## 2024-02-14 PROCEDURE — 99213 OFFICE O/P EST LOW 20 MIN: CPT | Mod: 25 | Performed by: FAMILY MEDICINE

## 2024-02-14 PROCEDURE — 91320 SARSCV2 VAC 30MCG TRS-SUC IM: CPT | Performed by: FAMILY MEDICINE

## 2024-02-14 PROCEDURE — 90686 IIV4 VACC NO PRSV 0.5 ML IM: CPT | Performed by: FAMILY MEDICINE

## 2024-02-14 PROCEDURE — 99396 PREV VISIT EST AGE 40-64: CPT | Mod: 25 | Performed by: FAMILY MEDICINE

## 2024-02-14 RX ORDER — MOMETASONE FUROATE MONOHYDRATE 50 UG/1
2 SPRAY, METERED NASAL DAILY
Qty: 17 G | Refills: 11 | Status: SHIPPED | OUTPATIENT
Start: 2024-02-14

## 2024-02-14 SDOH — HEALTH STABILITY: PHYSICAL HEALTH: ON AVERAGE, HOW MANY DAYS PER WEEK DO YOU ENGAGE IN MODERATE TO STRENUOUS EXERCISE (LIKE A BRISK WALK)?: 1 DAY

## 2024-02-14 ASSESSMENT — PATIENT HEALTH QUESTIONNAIRE - PHQ9
10. IF YOU CHECKED OFF ANY PROBLEMS, HOW DIFFICULT HAVE THESE PROBLEMS MADE IT FOR YOU TO DO YOUR WORK, TAKE CARE OF THINGS AT HOME, OR GET ALONG WITH OTHER PEOPLE: NOT DIFFICULT AT ALL
SUM OF ALL RESPONSES TO PHQ QUESTIONS 1-9: 2
SUM OF ALL RESPONSES TO PHQ QUESTIONS 1-9: 2

## 2024-02-14 ASSESSMENT — SOCIAL DETERMINANTS OF HEALTH (SDOH): HOW OFTEN DO YOU GET TOGETHER WITH FRIENDS OR RELATIVES?: TWICE A WEEK

## 2024-02-14 NOTE — PROGRESS NOTES
"Preventive Care Visit  [facility] SAVANAH Lanza MD, Family Medicine  Feb 14, 2024    Assessment & Plan     Screen for colon cancer  Patient has insurance that makes colonoscopy very prohibitive so would prefer the Cologuard test.  - COLOGUARD([employer]); Future    Primary hypertension    Blood pressure well-controlled he has hydrochlorothiazide only taking it regularly no side effects noted    Routine history and physical examination of adult    Patient's fasting checking lipid profile and CMP today.  - Comprehensive metabolic panel (BMP + Alb, Alk Phos, ALT, AST, Total. Bili, TP); Future  - Lipid panel reflex to direct LDL Fasting; Future  - Comprehensive metabolic panel (BMP + Alb, Alk Phos, ALT, AST, Total. Bili, TP)  - Lipid panel reflex to direct LDL Fasting    Seasonal allergic rhinitis due to pollen    He has been using his Flonase regularly it is not working well he should try an alternative has been having nasal congestion and postnasal drainage will try Nasonex  - mometasone (NASONEX) 50 MCG/ACT nasal spray; Spray 2 sprays into both nostrils daily    Sleep apnea, unspecified type    Multiple apneic episodes noted by the patient sleep quality described as being very poor  - Adult Sleep Eval & Management  Referral; Future    Neuralgia of left foot    Bilateral numbness noted in his feet for years no trauma he has not noticed any nail changes he notes that the symptoms occur intermittently and are not increasing only his feet are involved A1c today shows prediabetic range  - Hemoglobin A1c; Future  - Hemoglobin A1c            BMI  Estimated body mass index is 33.96 kg/m  as calculated from the following:    Height as of this encounter: 1.854 m (6' 1\").    Weight as of this encounter: 116.8 kg (257 lb 7 oz).       Counseling  Appropriate preventive services were discussed with this patient, including applicable screening as appropriate for fall prevention, nutrition, " physical activity, Tobacco-use cessation, weight loss and cognition.  Checklist reviewing preventive services available has been given to the patient.  Reviewed patient's diet, addressing concerns and/or questions.   He is at risk for lack of exercise and has been provided with information to increase physical activity for the benefit of his well-being.             Linus Espinal is a 54 year old, presenting for the following:  Physical, Allergies (Are not getting better with medication ), and Numbness (Bilateral foot /)        2/14/2024     9:44 AM   Additional Questions   Roomed by Kacey Chery   Accompanied by Self        Health Care Directive  Patient does not have a Health Care Directive or Living Will: Discussed advance care planning with patient; information given to patient to review.    HPI    54-year-old male here for annual physical he is fasting today.  Previously diagnosed with ADHD.  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  He reports he has changed professions and has more time to exercise.  He also reports that he has noticed some numbness in his feet that he never neglected to mention it before he says that this numbness for 7 to 8 years send both feet and occurs from time to time he notes that the symptoms are not increasing he has not had any injuries to his feet because of this numbness.  He also has had nasal stuffiness and this has been going on despite him taking Flonase and using cetirizine cannot sleep at night with his mouth closed and thinks he is snoring and having apneic episodes he can hear himself sometimes snoring at night.  He also feels discharge from the back of his throat makes him cough when he lies down.            2/14/2024   General Health   How would you rate your overall physical  health? Good   Feel stress (tense, anxious, or unable to sleep) Not at all         2/14/2024   Nutrition   Three or more servings of calcium each day? (!) NO   Diet: Regular (no restrictions)   How many servings of fruit and vegetables per day? (!) 0-1   How many sweetened beverages each day? (!) 2         2/14/2024   Exercise   Days per week of moderate/strenous exercise 1 day   (!) EXERCISE CONCERN      2/14/2024   Social Factors   Frequency of gathering with friends or relatives Twice a week   Worry food won't last until get money to buy more No   Food not last or not have enough money for food? No   Do you have housing?  Yes   Are you worried about losing your housing? No   Lack of transportation? No   Unable to get utilities (heat,electricity)? No         2/14/2024   Fall Risk   Fallen 2 or more times in the past year? No   Trouble with walking or balance? No          2/14/2024   Dental   Dentist two times every year? Yes         2/14/2024   TB Screening   Were you born outside of [place]?  No       Today's PHQ-9 Score:       2/14/2024     9:32 AM   PHQ-9 SCORE   PHQ-9 Total Score MyChart 2 (Minimal depression)   PHQ-9 Total Score 2         2/14/2024   Substance Use   Alcohol more than 3/day or more than 7/wk No   Do you use any other substances recreationally? No     Social History     Tobacco Use    Smoking status: Never     Passive exposure: Never    Smokeless tobacco: Never    Tobacco comments:     No passive exposure   Vaping Use    Vaping Use: Never used   Substance Use Topics    Alcohol use: Yes     Comment: Alcoholic Drinks/day: occasional    Drug use: Never           2/14/2024   STI Screening   New sexual partner(s) since last STI/HIV test? No   The 10-year ASCVD risk score (Sabrina MOMIN, et al., 2019) is: 15.2%    Values used to calculate the score:      Age: 54 years      Sex: Male      Is Non- : No      Diabetic: No      Tobacco smoker: No      Systolic Blood Pressure: 138 mmHg       "Is BP treated: Yes      HDL Cholesterol: 29 mg/dL      Total Cholesterol: 291 mg/dL           Reviewed and updated as needed this visit by Provider                          Review of Systems  Constitutional, neuro, ENT, endocrine, pulmonary, cardiac, gastrointestinal, genitourinary, musculoskeletal, integument and psychiatric systems are negative, except as otherwise noted.     Objective    Exam  /86 (BP Location: Left arm, Patient Position: Sitting, Cuff Size: Adult Regular)   Pulse 87   Temp 97.5  F (36.4  C) (Oral)   Resp 20   Ht 1.854 m (6' 1\")   Wt 116.8 kg (257 lb 7 oz)   SpO2 94%   BMI 33.96 kg/m     Estimated body mass index is 33.96 kg/m  as calculated from the following:    Height as of this encounter: 1.854 m (6' 1\").    Weight as of this encounter: 116.8 kg (257 lb 7 oz).    Physical Exam  GENERAL: alert and no distress  EYES: Eyes grossly normal to inspection, PERRL and conjunctivae and sclerae normal  HENT: normal cephalic/atraumatic, ear canals and TM's normal, nasal mucosa edematous , rhinorrhea clear, oropharynx clear, and oral mucous membranes moist  NECK: no adenopathy, no asymmetry, masses, or scars  RESP: lungs clear to auscultation - no rales, rhonchi or wheezes  CV: regular rate and rhythm, normal S1 S2, no S3 or S4, no murmur, click or rub, no peripheral edema  ABDOMEN: soft, nontender, no hepatosplenomegaly, no masses and bowel sounds normal  MS: no gross musculoskeletal defects noted, no edema  SKIN: no suspicious lesions or rashes  NEURO: Normal strength and tone, mentation intact and speech normal  PSYCH: mentation appears normal, affect normal/bright      Signed Electronically by: Issa Lanza MD    Answers submitted by the patient for this visit:  Patient Health Questionnaire (Submitted on 2/14/2024)  If you checked off any problems, how difficult have these problems made it for you to do your work, take care of things at home, or get along with other people?: Not difficult " at all  PHQ9 TOTAL SCORE: 2

## 2024-02-15 ENCOUNTER — TELEPHONE (OUTPATIENT)
Dept: FAMILY MEDICINE | Facility: CLINIC | Age: 54
End: 2024-02-15
Payer: COMMERCIAL

## 2024-02-15 NOTE — TELEPHONE ENCOUNTER
Called pt and relayed message. Pt verbalized understanding        Gatito Willson Cem Say, BSN RN  [facility]      ----- Message from Issa Lanza MD sent at 2/15/2024  9:03 AM CST -----  Please inform the patient that his kidney function is normal however his glucose is slightly high cholesterol has decreased since his last visit, his lab test show that he is prediabetic this can be corrected if he modifies his diet and start his exe  rcise again we should recheck this value in 3 months

## 2024-03-04 LAB — NONINV COLON CA DNA+OCC BLD SCRN STL QL: NEGATIVE

## 2024-07-24 DIAGNOSIS — I10 ESSENTIAL HYPERTENSION: ICD-10-CM

## 2024-07-24 NOTE — TELEPHONE ENCOUNTER
Medication Question or Refill        What medication are you calling about (include dose and sig)?: hydrochlorothiazide (HYDRODIURIL) 25 MG tablet     Preferred Pharmacy:   [facility]/pharmacy #[id number] - [facility], [place] - [address]  [address] [address]  Phone: [phone] Fax: [phone]      Controlled Substance Agreement on file:   CSA -- Patient Level:     [Media Unavailable] Controlled Substance Agreement - Opioid - Scan on 8/17/2017: ADDERALL       Who prescribed the medication?: Issa Lanza MD     Do you need a refill? Yes    When did you use the medication last? 7/22024    Patient offered an appointment? No    Do you have any questions or concerns?  No    Could we send this information to you in [facility] or would you prefer to receive a phone call?:   Patient would prefer a phone call   Okay to leave a detailed message?: Yes at Cell number on file:    Telephone Information:   Mobile [phone]

## 2024-07-25 RX ORDER — HYDROCHLOROTHIAZIDE 25 MG/1
TABLET ORAL
Qty: 90 TABLET | Refills: 0 | Status: SHIPPED | OUTPATIENT
Start: 2024-07-25

## 2024-07-25 NOTE — TELEPHONE ENCOUNTER
Author contacted patient to notify (has been notified several times) that he needs to establish care with a new provider.     He states that he has no insurance and that office visits cost him about $300. Patient stated that he wants to skip establish care visits and simply schedule a PHYSICAL with Dr. Damon in February when he would be due for his next physical and then have that be his establish care visit with Dr. Damon.     Author indicated that he would have to confer with covering MD for Dr. Lanza (Dr. Phillips) and then call back.

## 2024-07-25 NOTE — TELEPHONE ENCOUNTER
Per Pharmacy, pt is out and needs a new Rx. Can Covering Provider for Dr. Lanza refill or does Pt needs to be seen for establishing care with new Provider? Please call Pt back to update.

## 2024-07-26 NOTE — TELEPHONE ENCOUNTER
Author attempted to reach the patient. No answer. Author did not leave voice message.     If patient calls please schedule ESTABLISH CARE/PHYSICAL in February 2025 (after 02/14/25) with Dr. Damon or other provider accepting new patients.     Dr. Phillips (covering for Dr. Lanza on 07/25/24) refilled medication.

## 2024-10-25 DIAGNOSIS — I10 ESSENTIAL HYPERTENSION: ICD-10-CM

## 2024-10-25 RX ORDER — HYDROCHLOROTHIAZIDE 25 MG/1
TABLET ORAL
Qty: 90 TABLET | Refills: 0 | Status: SHIPPED | OUTPATIENT
Start: 2024-10-25

## 2025-02-27 ENCOUNTER — OFFICE VISIT (OUTPATIENT)
Dept: FAMILY MEDICINE | Facility: CLINIC | Age: 55
End: 2025-02-27

## 2025-02-27 VITALS
RESPIRATION RATE: 15 BRPM | OXYGEN SATURATION: 93 % | TEMPERATURE: 98.7 F | HEART RATE: 84 BPM | HEIGHT: 74 IN | DIASTOLIC BLOOD PRESSURE: 88 MMHG | SYSTOLIC BLOOD PRESSURE: 129 MMHG | BODY MASS INDEX: 33.64 KG/M2 | WEIGHT: 262.08 LBS

## 2025-02-27 DIAGNOSIS — Z59.71 DOES NOT HAVE HEALTH INSURANCE: ICD-10-CM

## 2025-02-27 DIAGNOSIS — R22.31 MASS OF FINGER OF RIGHT HAND: ICD-10-CM

## 2025-02-27 DIAGNOSIS — Z00.00 ENCOUNTER FOR ANNUAL PHYSICAL EXAM: Primary | ICD-10-CM

## 2025-02-27 DIAGNOSIS — E66.9 OBESITY WITHOUT SERIOUS COMORBIDITY, UNSPECIFIED CLASS, UNSPECIFIED OBESITY TYPE: ICD-10-CM

## 2025-02-27 DIAGNOSIS — R79.89 ELEVATED LFTS: ICD-10-CM

## 2025-02-27 DIAGNOSIS — R20.0 NUMBNESS: ICD-10-CM

## 2025-02-27 DIAGNOSIS — F33.40 RECURRENT MAJOR DEPRESSIVE DISORDER, IN REMISSION: ICD-10-CM

## 2025-02-27 DIAGNOSIS — E78.5 HYPERLIPIDEMIA, UNSPECIFIED HYPERLIPIDEMIA TYPE: ICD-10-CM

## 2025-02-27 DIAGNOSIS — I10 ESSENTIAL HYPERTENSION: ICD-10-CM

## 2025-02-27 LAB
ALBUMIN SERPL BCG-MCNC: 4.4 G/DL (ref 3.5–5.2)
ALP SERPL-CCNC: 94 U/L (ref 40–150)
ALT SERPL W P-5'-P-CCNC: 22 U/L (ref 0–70)
ANION GAP SERPL CALCULATED.3IONS-SCNC: 11 MMOL/L (ref 7–15)
AST SERPL W P-5'-P-CCNC: 28 U/L (ref 0–45)
BASOPHILS # BLD AUTO: 0.1 10E3/UL (ref 0–0.2)
BASOPHILS NFR BLD AUTO: 1 %
BILIRUB DIRECT SERPL-MCNC: 0.2 MG/DL (ref 0–0.3)
BILIRUB SERPL-MCNC: 0.7 MG/DL
BUN SERPL-MCNC: 18.6 MG/DL (ref 6–20)
CALCIUM SERPL-MCNC: 10.1 MG/DL (ref 8.8–10.4)
CHLORIDE SERPL-SCNC: 100 MMOL/L (ref 98–107)
CHOLEST SERPL-MCNC: 261 MG/DL
CREAT SERPL-MCNC: 1.07 MG/DL (ref 0.67–1.17)
EGFRCR SERPLBLD CKD-EPI 2021: 83 ML/MIN/1.73M2
EOSINOPHIL # BLD AUTO: 0.3 10E3/UL (ref 0–0.7)
EOSINOPHIL NFR BLD AUTO: 4 %
ERYTHROCYTE [DISTWIDTH] IN BLOOD BY AUTOMATED COUNT: 12.4 % (ref 10–15)
EST. AVERAGE GLUCOSE BLD GHB EST-MCNC: 117 MG/DL
FASTING STATUS PATIENT QL REPORTED: NO
FASTING STATUS PATIENT QL REPORTED: NO
FOLATE SERPL-MCNC: 16.4 NG/ML (ref 4.6–34.8)
GLUCOSE SERPL-MCNC: 122 MG/DL (ref 70–99)
HBA1C MFR BLD: 5.7 % (ref 0–5.6)
HCO3 SERPL-SCNC: 28 MMOL/L (ref 22–29)
HCT VFR BLD AUTO: 42.7 % (ref 40–53)
HDLC SERPL-MCNC: 35 MG/DL
HGB BLD-MCNC: 15.2 G/DL (ref 13.3–17.7)
IMM GRANULOCYTES # BLD: 0 10E3/UL
IMM GRANULOCYTES NFR BLD: 0 %
LDLC SERPL CALC-MCNC: 178 MG/DL
LYMPHOCYTES # BLD AUTO: 1.8 10E3/UL (ref 0.8–5.3)
LYMPHOCYTES NFR BLD AUTO: 29 %
MAGNESIUM SERPL-MCNC: 2.3 MG/DL (ref 1.7–2.3)
MCH RBC QN AUTO: 29.1 PG (ref 26.5–33)
MCHC RBC AUTO-ENTMCNC: 35.6 G/DL (ref 31.5–36.5)
MCV RBC AUTO: 82 FL (ref 78–100)
MONOCYTES # BLD AUTO: 0.5 10E3/UL (ref 0–1.3)
MONOCYTES NFR BLD AUTO: 8 %
NEUTROPHILS # BLD AUTO: 3.7 10E3/UL (ref 1.6–8.3)
NEUTROPHILS NFR BLD AUTO: 58 %
NONHDLC SERPL-MCNC: 226 MG/DL
PLATELET # BLD AUTO: 231 10E3/UL (ref 150–450)
POTASSIUM SERPL-SCNC: 4.1 MMOL/L (ref 3.4–5.3)
PROT SERPL-MCNC: 8.3 G/DL (ref 6.4–8.3)
RBC # BLD AUTO: 5.23 10E6/UL (ref 4.4–5.9)
SODIUM SERPL-SCNC: 139 MMOL/L (ref 135–145)
TRIGL SERPL-MCNC: 241 MG/DL
TSH SERPL DL<=0.005 MIU/L-ACNC: 1.38 UIU/ML (ref 0.3–4.2)
VIT B12 SERPL-MCNC: 399 PG/ML (ref 232–1245)
VIT D+METAB SERPL-MCNC: 20 NG/ML (ref 20–50)
WBC # BLD AUTO: 6.4 10E3/UL (ref 4–11)

## 2025-02-27 PROCEDURE — 80061 LIPID PANEL: CPT | Performed by: FAMILY MEDICINE

## 2025-02-27 PROCEDURE — 99396 PREV VISIT EST AGE 40-64: CPT | Performed by: FAMILY MEDICINE

## 2025-02-27 PROCEDURE — 82607 VITAMIN B-12: CPT | Performed by: FAMILY MEDICINE

## 2025-02-27 PROCEDURE — 83036 HEMOGLOBIN GLYCOSYLATED A1C: CPT | Performed by: FAMILY MEDICINE

## 2025-02-27 PROCEDURE — 82306 VITAMIN D 25 HYDROXY: CPT | Performed by: FAMILY MEDICINE

## 2025-02-27 PROCEDURE — 99214 OFFICE O/P EST MOD 30 MIN: CPT | Mod: 25 | Performed by: FAMILY MEDICINE

## 2025-02-27 PROCEDURE — 80053 COMPREHEN METABOLIC PANEL: CPT | Performed by: FAMILY MEDICINE

## 2025-02-27 PROCEDURE — 83735 ASSAY OF MAGNESIUM: CPT | Performed by: FAMILY MEDICINE

## 2025-02-27 PROCEDURE — 84443 ASSAY THYROID STIM HORMONE: CPT | Performed by: FAMILY MEDICINE

## 2025-02-27 PROCEDURE — 36415 COLL VENOUS BLD VENIPUNCTURE: CPT | Performed by: FAMILY MEDICINE

## 2025-02-27 PROCEDURE — 82746 ASSAY OF FOLIC ACID SERUM: CPT | Performed by: FAMILY MEDICINE

## 2025-02-27 PROCEDURE — 82248 BILIRUBIN DIRECT: CPT | Performed by: FAMILY MEDICINE

## 2025-02-27 PROCEDURE — 85025 COMPLETE CBC W/AUTO DIFF WBC: CPT | Performed by: FAMILY MEDICINE

## 2025-02-27 PROCEDURE — G2211 COMPLEX E/M VISIT ADD ON: HCPCS | Performed by: FAMILY MEDICINE

## 2025-02-27 RX ORDER — HYDROCHLOROTHIAZIDE 25 MG/1
TABLET ORAL
Qty: 90 TABLET | Refills: 3 | Status: SHIPPED | OUTPATIENT
Start: 2025-02-27

## 2025-02-27 SDOH — HEALTH STABILITY: PHYSICAL HEALTH: ON AVERAGE, HOW MANY DAYS PER WEEK DO YOU ENGAGE IN MODERATE TO STRENUOUS EXERCISE (LIKE A BRISK WALK)?: 1 DAY

## 2025-02-27 ASSESSMENT — PATIENT HEALTH QUESTIONNAIRE - PHQ9
SUM OF ALL RESPONSES TO PHQ QUESTIONS 1-9: 1
10. IF YOU CHECKED OFF ANY PROBLEMS, HOW DIFFICULT HAVE THESE PROBLEMS MADE IT FOR YOU TO DO YOUR WORK, TAKE CARE OF THINGS AT HOME, OR GET ALONG WITH OTHER PEOPLE: NOT DIFFICULT AT ALL
SUM OF ALL RESPONSES TO PHQ QUESTIONS 1-9: 1

## 2025-02-27 NOTE — PATIENT INSTRUCTIONS
-[facility] you [facility].  -It was a pleasure to see you today.  -Please take a look at the information below for more specific details regarding the treatment plan and recommendations.  -In this after visit summary is a list of your medications and specific instructions.  Please review this carefully as there may be changes made to your medication list.  -If there are any particular questions or concerns, please feel free to reach out to Dr. Damon.  -If any labs have been completed, we will reach out to you about results.  If the results are normal or not concerning, a letter or MyChart message will be sent to you.  If any follow-up is needed, either Dr. Damon or the nurse will give you a call.  If you have not heard regarding results after 2 weeks, please reach out to the clinic.    Patient Instructions:    -You are doing great.  -Continue to eat well.  Try to increase your servings of calcium as this can help your bones stay strong and healthy.  Follow a nutrition plan rich in fruits and vegetables and low in fats and cholesterol.    -Be sure to eat 5-7 servings of fruits and vegetables each day.  -Find ways to stay active.  Try to get 150 minutes of moderate activity (where you are breathing faster and slightly sweating) each week.  -Try to maintain a body mass index (BMI) of 18.5-25 as this is considered a healthier weight range.  -Brush your teeth twice daily.  See a dentist every 6-12 months.  -Be sure to use sunblock with SPF 15 or greater when going outside for extended periods of time.  Sunblock should be used even when it is a cloudy day.  Do intermittent skin checks for any concerning skin changes.  Wearing a wide brimmed hat and sunglasses can also be helpful to protect your skin from the sun.  -Monitor for any abnormal skin changes (such as new moles/spots, painful moles, changes in your old moles, wounds that will not heal, multiple colors noted in one lesion,  lesions that are asymmetric or not circular, or anything that is concerning for you). If any of these are noted, please schedule an appointment to be seen.     -It is generally recommended for you to complete a health care directive or living will. These documents will be able to reflect your wishes and desire in the case that you are unable to express them yourself. Please let Dr. Damon know if you would like some assistance with this process.    -Avoid use of Talc. Using corn starch can help keep your toes/feet dry.  -Limit/avoid intake of caffeinated drinks (such as pop and coffee) as these can cause you to urinate more often, such as when you are working.  Try drinking flavored water, such as liquid IV.  Try to avoid carbonated water and any water that contains sugars.    Vaccinations due:   -The preventive measures below are recommended for you.    -Please connect with [facility] (see below) to schedule an appointment to get vaccinations at a reduced cost as you do not have health insurance.     Health Maintenance Due   Topic Date Due         Pneumococcal Vaccine: 50+ Years (1 of 1 - PCV) Never done    ZOSTER IMMUNIZATION (1 of 2) Never done    ANNUAL REVIEW OF HM ORDERS  09/20/2023    INFLUENZA VACCINE (1) 09/01/2024    COVID-19 Vaccine (6 - 2024-25 season) 09/01/2024    Hepatitis A vaccine (Dose number 2)             [facility]:   Low-cost shots for infants, children and adults who are uninsured or whose insurance does not cover shots. There is a $15 administration fee for each vaccine, however, no child is turned away if families are unable to pay these fees.    Bring photo ID, insurance card and immunization records with you to the clinic.  Parents or guardians must accompany those under 18 years old.  Billing is available for Medical Assistance (MA) and some insurances.  Cash, cards or checks are accepted.  Immunization services are available by appointment  only. Some same-day appointments are available.   Call [phone] to schedule an appointment.    Location   [place] [facility]  [address], [place]  [address]    [employer] buses and [employer] stop near the clinic. Free parking is available in the back lot. Parking meters are on surrounding streets.      Please seek immediate medical attention (go to the emergency room or urgent care) for the following reasons: worsening symptoms, or any concerning changes.      --------------------------------------------------------------------------------------------------------------------    -We are always looking for ways to improve.  You may be selected to receive a survey regarding your visit today.  We encourage you to complete the survey and provide specific, constructive feedback to help us improve our processes.  Thank you for your time!  -Please review the contact information listed on the after visit summary and in the electronic chart.  Below is the phone number that we have on file.  If there are any changes that are needed to be made, please reach out to the clinic.  [phone] (home) [phone] (work)

## 2025-02-27 NOTE — PROGRESS NOTES
Preventive Care Visit  [facility] SAVANAH Damon MD, Family Medicine  Feb 27, 2025      Assessment & Plan     Patient Instructions:    -You are doing great.  -Continue to eat well.  Try to increase your servings of calcium as this can help your bones stay strong and healthy.  Follow a nutrition plan rich in fruits and vegetables and low in fats and cholesterol.    -Be sure to eat 5-7 servings of fruits and vegetables each day.  -Find ways to stay active.  Try to get 150 minutes of moderate activity (where you are breathing faster and slightly sweating) each week.  -Try to maintain a body mass index (BMI) of 18.5-25 as this is considered a healthier weight range.  -Brush your teeth twice daily.  See a dentist every 6-12 months.  -Be sure to use sunblock with SPF 15 or greater when going outside for extended periods of time.  Sunblock should be used even when it is a cloudy day.  Do intermittent skin checks for any concerning skin changes.  Wearing a wide brimmed hat and sunglasses can also be helpful to protect your skin from the sun.  -Monitor for any abnormal skin changes (such as new moles/spots, painful moles, changes in your old moles, wounds that will not heal, multiple colors noted in one lesion, lesions that are asymmetric or not circular, or anything that is concerning for you). If any of these are noted, please schedule an appointment to be seen.     -It is generally recommended for you to complete a health care directive or living will. These documents will be able to reflect your wishes and desire in the case that you are unable to express them yourself. Please let Dr. Damon know if you would like some assistance with this process.    -Avoid use of Talc. Using corn starch can help keep your toes/feet dry.  -Limit/avoid intake of caffeinated drinks (such as pop and coffee) as these can cause you to urinate more often, such as when you are working.  Try drinking flavored water, such as  liquid IV.  Try to avoid carbonated water and any water that contains sugars.    Vaccinations due:   -The preventive measures below are recommended for you.    -Please connect with [facility] (see below) to schedule an appointment to get vaccinations at a reduced cost as you do not have health insurance.     Health Maintenance Due   Topic Date Due         Pneumococcal Vaccine: 50+ Years (1 of 1 - PCV) Never done    ZOSTER IMMUNIZATION (1 of 2) Never done    ANNUAL REVIEW OF HM ORDERS  09/20/2023    INFLUENZA VACCINE (1) 09/01/2024    COVID-19 Vaccine (6 - 2024-25 season) 09/01/2024    Hepatitis A vaccine (Dose number 2)             [facility]:   Low-cost shots for infants, children and adults who are uninsured or whose insurance does not cover shots. There is a $15 administration fee for each vaccine, however, no child is turned away if families are unable to pay these fees.    Bring photo ID, insurance card and immunization records with you to the clinic.  Parents or guardians must accompany those under 18 years old.  Billing is available for Medical Assistance (MA) and some insurances.  Cash, cards or checks are accepted.  Immunization services are available by appointment only. Some same-day appointments are available.   Call [phone] to schedule an appointment.    Location   [place] [facility] [address], [place]  [address]    [employer] buses and [place] stop near the clinic. Free parking is available in the back lot. Parking meters are on surrounding streets.      Please seek immediate medical attention (go to the emergency room or urgent care) for the following reasons: worsening symptoms, or any concerning changes.      Teddy was seen today for physical.  Diagnoses and all orders for this visit:    Encounter for annual physical exam  Essential hypertension  Obesity without serious comorbidity, unspecified class,  unspecified obesity type  Hyperlipidemia, unspecified hyperlipidemia type  Elevated LFTs  Recurrent major depressive disorder, in remission: Overall stable.  Refill for hydrochlorothiazide given below. Check labs.  -     hydrochlorothiazide (HYDRODIURIL) 25 MG tablet; TAKE 1 TABLET BY MOUTH EVERY DAY  -     Basic metabolic panel; Future  -     Hemoglobin A1c; Future  -     CBC with Platelets & Differential; Future  -     Hepatic function panel; Future  -     Lipid panel reflex to direct LDL Fasting; Future  -     Folate; Future  -     Magnesium; Future  -     TSH with free T4 reflex; Future  -     Vitamin B12; Future  -     Vitamin D Deficiency; Future      Numbness:Of toes bilaterally, affecting most of his toes. Started after frequent trauma to toes (running into things) and has seemed to worsen. Patient sits for extended periods of time for work.  Check labs below.   Orders:  -     Basic metabolic panel; Future  -     Hemoglobin A1c; Future  -     CBC with Platelets & Differential; Future  -     Hepatic function panel; Future  -     Lipid panel reflex to direct LDL Fasting; Future  -     Folate; Future  -     Magnesium; Future  -     TSH with free T4 reflex; Future  -     Vitamin B12; Future  -     Vitamin D Deficiency; Future    Mass of finger of right hand: on proximal ring finger and palm. Nontender. Likely Dupuytren's nodule vs ganglion cyst. Discussed monitoring and indications to seek medication attention.     Does not have health insurance: Patient paying out of pocket. Due for vaccinations, though because of cost, information regarding [facility] given to patient. He will connect with them to get his vaccinations completed.       Patient has been advised of split billing requirements and indicates understanding: Yes    Discussed diagnoses, pathophysiology, treatment options and recommendations.    BMI  Estimated body mass index is 34.11 kg/m  as calculated from the following:     "Height as of this encounter: 1.867 m (6' 1.5\").    Weight as of this encounter: 118.9 kg (262 lb 1.3 oz).   Discussed recommendations.      Counseling  Appropriate preventive services were addressed with this patient via screening, questionnaire, or discussion as appropriate for fall prevention, nutrition, physical activity, Tobacco-use cessation, social engagement, weight loss and cognition.  Checklist reviewing preventive services available has been given to the patient.  Reviewed patient's diet, addressing concerns and/or questions.   He is at risk for lack of exercise and has been provided with information to increase physical activity for the benefit of his well-being.         Linus Espinal is a 55 year old, presenting for the following:  Physical        2/27/2025     8:45 AM   Additional Questions   Roomed by ALEKSEY HUFF CMA   Accompanied by None      Answers submitted by the patient for this visit:  Patient Health Questionnaire (Submitted on 2/27/2025)  If you checked off any problems, how difficult have these problems made it for you to do your work, take care of things at home, or get along with other people?: Not difficult at all  PHQ9 TOTAL SCORE: 1    HPI    Finger spur: can feel something on the ring finger. It is on the palm side of the proximal phalanx. It seems smaller now. Hurts a bit in th last week or so if he grabbed something that it would pinch his finger.  Reviewed that this is likely a Dupuytren's nodule.  Discussed diagnoses and monitoring recommendations.  Reviewed red flag signs.    Toe numbness: smashed the toe on furniture. Most of this toes are numb now. Over the years, he will hit his toes at various speeds. This affects both feet. He is not sure if it is all the toes. The nerve damage ones are much longer, but several other toes became numb 1-2 years ago. Denies back pains. As part of his depression, his legs would go completely tird al th time. Less depression issues the last 2-3 " years. He spent a lot of last year working out and his legs were sore from working out. Diet wise, he doesn't eat enough fruits and vegetables. Limits beef to once a week. Has had a lot of salads last year.     Using Talc on the toes for years off and on. Helps the toes from getting sweaty and smelly. Discussed stopping Talc and using other powders, such as corn starch.       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 He urinated four times in a four hour shift. If he rinks tea, then he has to pee. He doesn't drink energy drinks. When he isn't working, he doesn't pee a lot. No emptying issues noted when peeing. Discussed reduced usage of fluids and avoiding/limiting intake of drinks with caffeine.     Hypertension: Blood pressure is 129/90 today. On recheck, BP is 129/88.  Currently on hydrochlorothiazide 25 mg once daily.    Seasonal allergic rhinitis: Currently on mometasone nasal spray and zetirizine.  Helpful for patient.  Using as needed.    Has adderall. 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 He does okay without the medication at his current job.    A1c was 6.3 from 2/14/2024.  Due for repeat A1c today.    -Reviewed healthy eating and exercise.  -Skin cancer screening: No concerning skin changes expressed by patient.  -Smoking status:   Tobacco Use      Smoking status: Never        Passive exposure: Never      Smokeless tobacco: Never      Tobacco comments: No passive exposure        -Family history: CAD, HTN, DM: none  Family History   Problem Relation Age of Onset    Coronary Artery Disease No family hx of     Diabetes No family hx of     Hypertension No family hx of        Preventative health recommendations, evaluation options, and risk/benefits of each were discussed with patient. Accepted recommendations were ordered. Otherwise, patient declined.  Health Maintenance Due   Topic Date Due    DEPRESSION ACTION PLAN  Never done    Pneumococcal Vaccine:  50+ Years (1 of 1 - PCV) Never done    ZOSTER IMMUNIZATION (1 of 2) Never done    ANNUAL REVIEW OF HM ORDERS  09/20/2023    INFLUENZA VACCINE (1) 09/01/2024    COVID-19 Vaccine (6 - 2024-25 season) 09/01/2024    BMP  02/14/2025    YEARLY PREVENTIVE VISIT  02/14/2025       On review of notes, it appears that patient had received the first dose of the hepatitis A vaccine on 01/18/2018.  No hepatitis A vaccines received today after.  No immunity testing noted either.  Reviewed usual recommendations for hepatitis A dosing (2 doses).    -Immunizations due were reviewed.    Immunization History   Administered Date(s) Administered    COVID-19 12+ (Pfizer) 02/14/2024    COVID-19 Bivalent 18+ (Moderna) 09/20/2022    COVID-19 MONOVALENT 12+ (Pfizer) 04/23/2021, 05/17/2021    COVID-19 Monovalent Booster 18+ (Moderna) 11/24/2021    Flu, Unspecified 12/17/1996, 11/05/2009, 10/25/2010, 10/07/2011, 10/03/2012    HepB, Unspecified 11/06/2012, 12/05/2012    Hepatitis A (ADULT 19+) 01/18/2018    Hepatitis B, Adult 01/18/2018    Influenza Vaccine 18-64 (Flublok) 09/20/2022    Influenza Vaccine >6 months,quad, PF 09/22/2014, 09/29/2017, 02/14/2024    Influenza Vaccine, 6+MO IM (QUADRIVALENT W/PRESERVATIVES) 12/07/2018, 11/27/2019    TDAP (Adacel,Boostrix) 10/25/2010, 09/20/2022    Td,adult,historic,unspecified 12/17/1996, 01/16/2004       -Labs: Laboratory recommendations reviewed with patient.    -Colon cancer screening:   LUKAS([employer])  Order: [id number]  Collected 2/23/2024  2:30 PM       Status: Final result    0 Result Notes       1  Topic         Component  Ref Range & Units 1 yr ago    LUKAS-ABSTRACT  Negative Negative              Health Care Directive  Patient does not have a Health Care Directive: Discussed advance care planning with patient; information given to patient to review.      2/27/2025   General Health   How would you rate your overall physical health? Good         2/27/2025   Nutrition   Three or  more servings of calcium each day? (!) NO   Diet: Regular (no restrictions)   How many servings of fruit and vegetables per day? (!) 0-1   How many sweetened beverages each day? 0-1         2/27/2025   Exercise   Days per week of moderate/strenous exercise 1 day         2/14/2024   Social Factors   Frequency of gathering with friends or relatives Twice a week   Worry food won't last until get money to buy more No   Food not last or not have enough money for food? No   Do you have housing? (Housing is defined as stable permanent housing and does not include staying ouside in a car, in a tent, in an abandoned building, in an overnight shelter, or couch-surfing.) Yes   Are you worried about losing your housing? No   Lack of transportation? No   Unable to get utilities (heat,electricity)? No         2/14/2024   Dental   Dentist two times every year? Yes         2/14/2024   TB Screening   Were you born outside of the [place]? No       Today's PHQ-9 Score:       2/27/2025     8:42 AM   PHQ-9 SCORE   PHQ-9 Total Score MyChart 1 (Minimal depression)   PHQ-9 Total Score 1        Patient-reported         2/14/2024   Substance Use   Alcohol more than 3/day or more than 7/wk No   Do you use any other substances recreationally? No     Social History     Tobacco Use    Smoking status: Never     Passive exposure: Never    Smokeless tobacco: Never    Tobacco comments:     No passive exposure   Vaping Use    Vaping status: Never Used   Substance Use Topics    Alcohol use: Yes     Comment: Alcoholic Drinks/day: occasional    Drug use: Never       ASCVD Risk   The 10-year ASCVD risk score (Sabrina MOMIN, et al., 2019) is: 10.6%    Values used to calculate the score:      Age: 55 years      Sex: Male      Is Non- : No      Diabetic: No      Tobacco smoker: No      Systolic Blood Pressure: 129 mmHg      Is BP treated: Yes      HDL Cholesterol: 35 mg/dL      Total Cholesterol: 261 mg/dL      Reviewed and updated  "as needed this visit by Provider        Surg Hx  Fam Hx            No past medical history on file.  Past Surgical History:   Procedure Laterality Date    No previous surgery       No previous surgeries.    The 10 point review of system was negative unless otherwise stated in the HPI.       Objective    Exam  /88 (BP Location: Right arm, Patient Position: Sitting, Cuff Size: Adult Regular)   Pulse 84   Temp 98.7  F (37.1  C) (Oral)   Resp 15   Ht 1.867 m (6' 1.5\")   Wt 118.9 kg (262 lb 1.3 oz)   SpO2 93%   BMI 34.11 kg/m     Estimated body mass index is 34.11 kg/m  as calculated from the following:    Height as of this encounter: 1.867 m (6' 1.5\").    Weight as of this encounter: 118.9 kg (262 lb 1.3 oz).    Physical Exam  GENERAL: alert and no distress  EYES: Eyes grossly normal to inspection, PERRL and conjunctivae and sclerae normal  HENT: Right ear canal was 95% occluded with cerumen.  Manual disimpaction with lighted loop curette was completed without any complications.  Ear canals and TM's normal, nose and mouth without ulcers or lesions.    NECK: no adenopathy, no asymmetry, masses, or scars  RESP: lungs clear to auscultation - no rales, rhonchi or wheezes  CV: regular rate and rhythm, normal S1 S2, no S3 or S4, no murmur, click or rub, no peripheral edema  ABDOMEN: soft, nontender, no hepatosplenomegaly, no masses and bowel sounds normal  MS: Right upper extremity: On the area overlying the fourth MCP joint and proximal phalanx of the fourth digit is a nodule that is about 3 mm in diameter that is firm and moves slightly with flexion and extension of the ring finger.  Mildly tender to deep palpation.  Joint space itself appears normal on examination.  Surrounding skin appears normal.  Otherwise, no gross musculoskeletal defects noted, no edema  SKIN: no suspicious lesions or rashes  NEURO: Normal strength and tone, mentation intact and speech normal.  Toes: Normal sensation and range of " motion.  Capillary fill less than 3 seconds.  Calluses noted on the sole side at the fifth MTP joint.  Gait otherwise normal on exam.  PSYCH: mentation appears normal, affect normal/bright        Signed Electronically by:    Kale Damon MD  [facility] [place] [address]  Phone: [phone]  Fax: [phone]    3/4/2025  7:22 AM

## 2025-03-04 ENCOUNTER — TELEPHONE (OUTPATIENT)
Dept: FAMILY MEDICINE | Facility: CLINIC | Age: 55
End: 2025-03-04

## 2025-03-04 DIAGNOSIS — J30.1 SEASONAL ALLERGIC RHINITIS DUE TO POLLEN: ICD-10-CM

## 2025-03-04 PROBLEM — E78.5 HYPERLIPIDEMIA, UNSPECIFIED HYPERLIPIDEMIA TYPE: Status: ACTIVE | Noted: 2025-03-04

## 2025-03-04 PROBLEM — I10 ESSENTIAL HYPERTENSION: Status: ACTIVE | Noted: 2018-01-18

## 2025-03-04 PROBLEM — R20.0 NUMBNESS: Status: ACTIVE | Noted: 2025-03-04

## 2025-03-04 PROBLEM — E66.9 OBESITY WITHOUT SERIOUS COMORBIDITY, UNSPECIFIED CLASS, UNSPECIFIED OBESITY TYPE: Status: ACTIVE | Noted: 2025-03-04

## 2025-03-04 PROBLEM — Z59.71 DOES NOT HAVE HEALTH INSURANCE: Status: ACTIVE | Noted: 2025-03-04

## 2025-03-04 RX ORDER — MOMETASONE FUROATE MONOHYDRATE 50 UG/1
2 SPRAY, METERED NASAL DAILY
Qty: 17 G | Refills: 10 | Status: SHIPPED | OUTPATIENT
Start: 2025-03-04

## 2025-03-04 NOTE — RESULT ENCOUNTER NOTE
Team - please call patient with results.   Mail letter if unable to get a hold of patient.   Patient doesn't check MyChart.     Gabedejuan Teddyleonardo Ayala,    I hope you have been well since our last visit. Below are the results from the testing completed at the visit.     The following results were normal or not concerning: Kidney function, electrolytes, liver function, magnesium, thyroid function, and vitamin B12, vitamin D, folate, blood counts.    The hemoglobin A1c, which is the average blood sugars over the last 3 months, is 5.7%.  This is in the prediabetes range and is improved compared to a year ago.     The total cholesterol and LDL or bad cholesterol are high.  The triglycerides, which is primarily affected by food, is high. The HDL or good cholesterol are in the low range.  Considering your overall clinical picture, the risks of having a heart attack or something similar in the next 10 years is 10.6%.  When this value is greater than 7.5%, a cholesterol-lowering medication (such as atorvastatin) is recommended for you.  It is also recommended to follow a diet that is rich in fruits and vegetables and low in fats and cholesterol.  In addition, find ways to stay active.  Doing aerobic activities (such as running, biking, etc.) will help improve the HDL or good cholesterol.  Weight loss is also recommended.    Please let the nurse know if you are willing to start this medication and a prescription can be sent for you.    Otherwise, Dr. Damon recommends that you continue on the plan as discussed in clinic.    If there are any questions or concerns, please call the clinic or schedule an appointment for follow up.     Best wishes,           Kale Damon MD  [facility]  3/4/2025  3:09 PM    The 10-year ASCVD risk score (Sabrina MOMIN, et al., 2019) is: 10.6%    Values used to calculate the score:      Age: 55 years      Sex: Male      Is Non- : No      Diabetic: No       Tobacco smoker: No      Systolic Blood Pressure: 129 mmHg      Is BP treated: Yes      HDL Cholesterol: 35 mg/dL      Total Cholesterol: 261 mg/dL

## 2025-03-04 NOTE — TELEPHONE ENCOUNTER
----- Message from Kale Damon sent at 3/4/2025  3:12 PM CST -----  Team - please call patient with results.   Mail letter if unable to get a hold of patient.   Patient doesn't check MyChart.     Mony STEWART Lucy,    I hope you have been well since our last visit. Below are the results from the testing completed at the visit.     The following results were normal or not concerning: Kidney function, electrolytes, liver function, magnesium, thyroid function, and vitamin B12, vitamin D, folate, blood counts.    The hemoglobin A1c, which is the average blood sugars over the last 3 months, is 5.7%.  This is in the prediabetes range and is improved compared to a year ago.     The total cholesterol and LDL or bad cholesterol are high.  The triglycerides, which is primarily affected by food, is high. The HDL or good cholesterol are in the low range.  Considering your overall clinical picture, the risks of having a heart attack or something similar in the next 10 years is 10.6%.  When this value is greater than 7.5%, a cholesterol-lowering medication (such as atorvastatin) is recommended for you.  It is also recommended to follow a diet that is rich in fruits and vegetables and low in fats and cholesterol.  In addition, find ways to stay active.  Doing aerobic activities (such as running, biking, etc.) will help improve the HDL or good cholesterol.  Weight loss is also recommended.    Please let the nurse know if you are willing to start this medication and a prescription can be sent for you.    Otherwise, Dr. Damon recommends that you continue on the plan as discussed in clinic.    If there are any questions or concerns, please call the clinic or schedule an appointment for follow up.     Best wishes,           Kale Damon MD  [facility]  3/4/2025  3:09 PM    The 10-year ASCVD risk score (Sabrina MOMIN, et al., 2019) is: 10.6%    Values used to calculate the score:      Age: 55 years      Sex:  Male      Is Non- : No      Diabetic: No      Tobacco smoker: No      Systolic Blood Pressure: 129 mmHg      Is BP treated: Yes      HDL Cholesterol: 35 mg/dL      Total Cholesterol: 261 mg/dL

## 2025-03-04 NOTE — LETTER
March 10, 2025      Teddy Ayala  [address]  [place] [address]        Dear ,    We are writing to inform you of your test results.    The following results were normal or not concerning: Kidney function, electrolytes, liver function, magnesium, thyroid function, and vitamin B12, vitamin D, folate, blood counts.     The hemoglobin A1c, which is the average blood sugars over the last 3 months, is 5.7%.  This is in the prediabetes range and is improved compared to a year ago.      The total cholesterol and LDL or bad cholesterol are high.  The triglycerides, which is primarily affected by food, is high. The HDL or good cholesterol are in the low range.  Considering your overall clinical picture, the risks of having a heart attack or something similar in the next 10 years is 10.6%.  When this value is greater than 7.5%, a cholesterol-lowering medication (such as atorvastatin) is recommended for you.  It is also recommended to follow a diet that is rich in fruits and vegetables and low in fats and cholesterol.  In addition, find ways to stay active.  Doing aerobic activities (such as running, biking, etc.) will help improve the HDL or good cholesterol.  Weight loss is also recommended.     Please let the nurse know if you are willing to start this medication and a prescription can be sent for you.     Otherwise, Dr. Damon recommends that you continue on the plan as discussed in clinic.    Resulted Orders   Basic metabolic panel   Result Value Ref Range    Sodium 139 135 - 145 mmol/L    Potassium 4.1 3.4 - 5.3 mmol/L    Chloride 100 98 - 107 mmol/L    Carbon Dioxide (CO2) 28 22 - 29 mmol/L    Anion Gap 11 7 - 15 mmol/L    Urea Nitrogen 18.6 6.0 - 20.0 mg/dL    Creatinine 1.07 0.67 - 1.17 mg/dL    GFR Estimate 83 >60 mL/min/1.73m2      Comment:      eGFR calculated using 2021 CKD-EPI equation.    Calcium 10.1 8.8 - 10.4 mg/dL    Glucose 122 (H) 70 - 99 mg/dL    Patient Fasting > 8hrs? No    Hemoglobin  A1c   Result Value Ref Range    Estimated Average Glucose 117 (H) <117 mg/dL    Hemoglobin A1C 5.7 (H) 0.0 - 5.6 %      Comment:      Normal <5.7%   Prediabetes 5.7-6.4%    Diabetes 6.5% or higher     Note: Adopted from ADA consensus guidelines.   Hepatic function panel   Result Value Ref Range    Protein Total 8.3 6.4 - 8.3 g/dL    Albumin 4.4 3.5 - 5.2 g/dL    Bilirubin Total 0.7 <=1.2 mg/dL    Alkaline Phosphatase 94 40 - 150 U/L    AST 28 0 - 45 U/L    ALT 22 0 - 70 U/L    Bilirubin Direct 0.20 0.00 - 0.30 mg/dL   Lipid panel reflex to direct LDL Fasting   Result Value Ref Range    Cholesterol 261 (H) <200 mg/dL    Triglycerides 241 (H) <150 mg/dL    Direct Measure HDL 35 (L) >=40 mg/dL    LDL Cholesterol Calculated 178 (H) <100 mg/dL    Non HDL Cholesterol 226 (H) <130 mg/dL    Patient Fasting > 8hrs? No     Narrative    Cholesterol  Desirable: < 200 mg/dL  Borderline High: 200 - 239 mg/dL  High: >= 240 mg/dL    Triglycerides  Normal: < 150 mg/dL  Borderline High: 150 - 199 mg/dL  High: 200-499 mg/dL  Very High: >= 500 mg/dL    Direct Measure HDL  Female: >= 50 mg/dL   Male: >= 40 mg/dL    LDL Cholesterol  Desirable: < 100 mg/dL  Above Desirable: 100 - 129 mg/dL   Borderline High: 130 - 159 mg/dL   High:  160 - 189 mg/dL   Very High: >= 190 mg/dL    Non HDL Cholesterol  Desirable: < 130 mg/dL  Above Desirable: 130 - 159 mg/dL  Borderline High: 160 - 189 mg/dL  High: 190 - 219 mg/dL  Very High: >= 220 mg/dL   Folate   Result Value Ref Range    Folic Acid 16.4 4.6 - 34.8 ng/mL   Magnesium   Result Value Ref Range    Magnesium 2.3 1.7 - 2.3 mg/dL   TSH with free T4 reflex   Result Value Ref Range    TSH 1.38 0.30 - 4.20 uIU/mL   Vitamin B12   Result Value Ref Range    Vitamin B12 399 232 - 1,245 pg/mL   Vitamin D Deficiency   Result Value Ref Range    Vitamin D, Total (25-Hydroxy) 20 20 - 50 ng/mL      Comment:      optimum levels    Narrative    Season, race, dietary intake, and treatment affect the concentration  of 25-hydroxy-Vitamin D. Values may decrease during winter months and increase during summer months.    Vitamin D determination is routinely performed by an immunoassay specific for 25 hydroxyvitamin D3.  If an individual is on vitamin D2(ergocalciferol) supplementation, please specify 25 OH vitamin D2 and D3 level determination by LCMSMS test [id number].     CBC with platelets and differential   Result Value Ref Range    WBC Count 6.4 4.0 - 11.0 10e3/uL    RBC Count 5.23 4.40 - 5.90 10e6/uL    Hemoglobin 15.2 13.3 - 17.7 g/dL    Hematocrit 42.7 40.0 - 53.0 %    MCV 82 78 - 100 fL    MCH 29.1 26.5 - 33.0 pg    MCHC 35.6 31.5 - 36.5 g/dL    RDW 12.4 10.0 - 15.0 %    Platelet Count 231 150 - 450 10e3/uL    % Neutrophils 58 %    % Lymphocytes 29 %    % Monocytes 8 %    % Eosinophils 4 %    % Basophils 1 %    % Immature Granulocytes 0 %    Absolute Neutrophils 3.7 1.6 - 8.3 10e3/uL    Absolute Lymphocytes 1.8 0.8 - 5.3 10e3/uL    Absolute Monocytes 0.5 0.0 - 1.3 10e3/uL    Absolute Eosinophils 0.3 0.0 - 0.7 10e3/uL    Absolute Basophils 0.1 0.0 - 0.2 10e3/uL    Absolute Immature Granulocytes 0.0 <=0.4 10e3/uL       If you have any questions or concerns, please call the clinic at the number listed above.       Sincerely,          Electronically signed

## 2025-03-10 NOTE — TELEPHONE ENCOUNTER
CMA left message x 3. Please review message thread below and advise the patient as indicated. Please schedule if necessary or indicated in message thread. Letter sent.